# Patient Record
Sex: MALE | Race: WHITE | NOT HISPANIC OR LATINO | URBAN - METROPOLITAN AREA
[De-identification: names, ages, dates, MRNs, and addresses within clinical notes are randomized per-mention and may not be internally consistent; named-entity substitution may affect disease eponyms.]

---

## 2021-06-16 PROBLEM — F51.01 INSOMNIA, IDIOPATHIC: Status: ACTIVE | Noted: 2020-09-08

## 2024-08-19 ENCOUNTER — APPOINTMENT (OUTPATIENT)
Dept: RADIOLOGY | Facility: CLINIC | Age: 34
End: 2024-08-19
Attending: EMERGENCY MEDICINE
Payer: COMMERCIAL

## 2024-08-19 LAB
ANION GAP SERPL CALCULATED.3IONS-SCNC: 13 MMOL/L (ref 7–15)
ATRIAL RATE - MUSE: 98 BPM
BASOPHILS # BLD MANUAL: 0 10E3/UL (ref 0–0.2)
BASOPHILS NFR BLD MANUAL: 0 %
BUN SERPL-MCNC: 22.2 MG/DL (ref 6–20)
CALCIUM SERPL-MCNC: 10.4 MG/DL (ref 8.8–10.4)
CHLORIDE SERPL-SCNC: 101 MMOL/L (ref 98–107)
CREAT SERPL-MCNC: 1.08 MG/DL (ref 0.67–1.17)
DIASTOLIC BLOOD PRESSURE - MUSE: NORMAL MMHG
EGFRCR SERPLBLD CKD-EPI 2021: >90 ML/MIN/1.73M2
EOSINOPHIL # BLD MANUAL: 0 10E3/UL (ref 0–0.7)
EOSINOPHIL NFR BLD MANUAL: 0 %
ERYTHROCYTE [DISTWIDTH] IN BLOOD BY AUTOMATED COUNT: 12.5 % (ref 10–15)
FLUAV RNA SPEC QL NAA+PROBE: NEGATIVE
FLUBV RNA RESP QL NAA+PROBE: NEGATIVE
GLUCOSE SERPL-MCNC: 153 MG/DL (ref 70–99)
HCO3 SERPL-SCNC: 25 MMOL/L (ref 22–29)
HCT VFR BLD AUTO: 46.9 % (ref 40–53)
HGB BLD-MCNC: 16 G/DL (ref 13.3–17.7)
HOLD SPECIMEN: NORMAL
HOLD SPECIMEN: NORMAL
INTERPRETATION ECG - MUSE: NORMAL
LYMPHOCYTES # BLD MANUAL: 2.2 10E3/UL (ref 0.8–5.3)
LYMPHOCYTES NFR BLD MANUAL: 18 %
MCH RBC QN AUTO: 28.7 PG (ref 26.5–33)
MCHC RBC AUTO-ENTMCNC: 34.1 G/DL (ref 31.5–36.5)
MCV RBC AUTO: 84 FL (ref 78–100)
METAMYELOCYTES # BLD MANUAL: 0.1 10E3/UL
METAMYELOCYTES NFR BLD MANUAL: 1 %
MONOCYTES # BLD MANUAL: 0.6 10E3/UL (ref 0–1.3)
MONOCYTES NFR BLD MANUAL: 5 %
NEUTROPHILS # BLD MANUAL: 9.3 10E3/UL (ref 1.6–8.3)
NEUTROPHILS NFR BLD MANUAL: 76 %
NRBC # BLD AUTO: 0 10E3/UL
NRBC BLD AUTO-RTO: 0 /100
P AXIS - MUSE: 37 DEGREES
PLAT MORPH BLD: ABNORMAL
PLATELET # BLD AUTO: 362 10E3/UL (ref 150–450)
POTASSIUM SERPL-SCNC: 4.4 MMOL/L (ref 3.4–5.3)
PR INTERVAL - MUSE: 152 MS
QRS DURATION - MUSE: 80 MS
QT - MUSE: 328 MS
QTC - MUSE: 418 MS
R AXIS - MUSE: 39 DEGREES
RBC # BLD AUTO: 5.58 10E6/UL (ref 4.4–5.9)
RBC MORPH BLD: ABNORMAL
RSV RNA SPEC NAA+PROBE: NEGATIVE
SARS-COV-2 RNA RESP QL NAA+PROBE: NEGATIVE
SODIUM SERPL-SCNC: 139 MMOL/L (ref 135–145)
SYSTOLIC BLOOD PRESSURE - MUSE: NORMAL MMHG
T AXIS - MUSE: -1 DEGREES
TROPONIN T SERPL HS-MCNC: 8 NG/L
VARIANT LYMPHS BLD QL SMEAR: PRESENT
VENTRICULAR RATE- MUSE: 98 BPM
WBC # BLD AUTO: 12.2 10E3/UL (ref 4–11)

## 2024-08-19 PROCEDURE — 999N000157 HC STATISTIC RCP TIME EA 10 MIN

## 2024-08-19 PROCEDURE — 85027 COMPLETE CBC AUTOMATED: CPT | Performed by: EMERGENCY MEDICINE

## 2024-08-19 PROCEDURE — 80048 BASIC METABOLIC PNL TOTAL CA: CPT | Performed by: EMERGENCY MEDICINE

## 2024-08-19 PROCEDURE — 84484 ASSAY OF TROPONIN QUANT: CPT | Performed by: EMERGENCY MEDICINE

## 2024-08-19 PROCEDURE — 85007 BL SMEAR W/DIFF WBC COUNT: CPT | Performed by: EMERGENCY MEDICINE

## 2024-08-19 PROCEDURE — 87637 SARSCOV2&INF A&B&RSV AMP PRB: CPT | Performed by: EMERGENCY MEDICINE

## 2024-08-19 PROCEDURE — 94640 AIRWAY INHALATION TREATMENT: CPT

## 2024-08-19 PROCEDURE — 96365 THER/PROPH/DIAG IV INF INIT: CPT

## 2024-08-19 PROCEDURE — 86308 HETEROPHILE ANTIBODY SCREEN: CPT | Performed by: INTERNAL MEDICINE

## 2024-08-19 PROCEDURE — 99285 EMERGENCY DEPT VISIT HI MDM: CPT | Mod: 25

## 2024-08-19 PROCEDURE — 250N000011 HC RX IP 250 OP 636: Performed by: EMERGENCY MEDICINE

## 2024-08-19 PROCEDURE — 83880 ASSAY OF NATRIURETIC PEPTIDE: CPT | Performed by: INTERNAL MEDICINE

## 2024-08-19 PROCEDURE — 84145 PROCALCITONIN (PCT): CPT | Performed by: INTERNAL MEDICINE

## 2024-08-19 PROCEDURE — 93005 ELECTROCARDIOGRAM TRACING: CPT | Performed by: EMERGENCY MEDICINE

## 2024-08-19 PROCEDURE — 36415 COLL VENOUS BLD VENIPUNCTURE: CPT | Performed by: EMERGENCY MEDICINE

## 2024-08-19 PROCEDURE — 250N000009 HC RX 250: Performed by: EMERGENCY MEDICINE

## 2024-08-19 PROCEDURE — 71046 X-RAY EXAM CHEST 2 VIEWS: CPT

## 2024-08-19 RX ORDER — IPRATROPIUM BROMIDE AND ALBUTEROL SULFATE 2.5; .5 MG/3ML; MG/3ML
3 SOLUTION RESPIRATORY (INHALATION) ONCE
Status: COMPLETED | OUTPATIENT
Start: 2024-08-19 | End: 2024-08-19

## 2024-08-19 RX ORDER — LEVOFLOXACIN 5 MG/ML
500 INJECTION, SOLUTION INTRAVENOUS ONCE
Status: COMPLETED | OUTPATIENT
Start: 2024-08-19 | End: 2024-08-20

## 2024-08-19 RX ADMIN — IPRATROPIUM BROMIDE AND ALBUTEROL SULFATE 3 ML: .5; 3 SOLUTION RESPIRATORY (INHALATION) at 23:21

## 2024-08-19 RX ADMIN — LEVOFLOXACIN 500 MG: 500 INJECTION, SOLUTION INTRAVENOUS at 23:28

## 2024-08-19 ASSESSMENT — ENCOUNTER SYMPTOMS
CHILLS: 1
COUGH: 1
SHORTNESS OF BREATH: 1
FEVER: 1

## 2024-08-19 ASSESSMENT — COLUMBIA-SUICIDE SEVERITY RATING SCALE - C-SSRS
2. HAVE YOU ACTUALLY HAD ANY THOUGHTS OF KILLING YOURSELF IN THE PAST MONTH?: NO
1. IN THE PAST MONTH, HAVE YOU WISHED YOU WERE DEAD OR WISHED YOU COULD GO TO SLEEP AND NOT WAKE UP?: NO
6. HAVE YOU EVER DONE ANYTHING, STARTED TO DO ANYTHING, OR PREPARED TO DO ANYTHING TO END YOUR LIFE?: NO

## 2024-08-20 ENCOUNTER — HOSPITAL ENCOUNTER (INPATIENT)
Facility: CLINIC | Age: 34
LOS: 3 days | Discharge: HOME | End: 2024-08-23
Attending: EMERGENCY MEDICINE | Admitting: INTERNAL MEDICINE
Payer: COMMERCIAL

## 2024-08-20 ENCOUNTER — APPOINTMENT (OUTPATIENT)
Dept: CT IMAGING | Facility: CLINIC | Age: 34
End: 2024-08-20
Attending: EMERGENCY MEDICINE
Payer: COMMERCIAL

## 2024-08-20 DIAGNOSIS — J18.9 PNEUMONIA OF LEFT LOWER LOBE DUE TO INFECTIOUS ORGANISM: ICD-10-CM

## 2024-08-20 DIAGNOSIS — G25.81 RESTLESS LEGS SYNDROME (RLS): Primary | ICD-10-CM

## 2024-08-20 DIAGNOSIS — F51.01 INSOMNIA, IDIOPATHIC: ICD-10-CM

## 2024-08-20 DIAGNOSIS — R06.2 WHEEZING: ICD-10-CM

## 2024-08-20 LAB
ALBUMIN SERPL BCG-MCNC: 4.1 G/DL (ref 3.5–5.2)
ALP SERPL-CCNC: 86 U/L (ref 40–150)
ALT SERPL W P-5'-P-CCNC: 69 U/L (ref 0–70)
ANION GAP SERPL CALCULATED.3IONS-SCNC: 12 MMOL/L (ref 7–15)
AST SERPL W P-5'-P-CCNC: 31 U/L (ref 0–45)
BASE EXCESS BLDV CALC-SCNC: 0.7 MMOL/L (ref -3–3)
BASOPHILS # BLD AUTO: 0 10E3/UL (ref 0–0.2)
BASOPHILS NFR BLD AUTO: 0 %
BILIRUB SERPL-MCNC: 0.3 MG/DL
BUN SERPL-MCNC: 18.9 MG/DL (ref 6–20)
C PNEUM DNA SPEC QL NAA+PROBE: NOT DETECTED
CALCIUM SERPL-MCNC: 9.4 MG/DL (ref 8.8–10.4)
CHLORIDE SERPL-SCNC: 103 MMOL/L (ref 98–107)
CREAT SERPL-MCNC: 0.89 MG/DL (ref 0.67–1.17)
EGFRCR SERPLBLD CKD-EPI 2021: >90 ML/MIN/1.73M2
EOSINOPHIL # BLD AUTO: 0 10E3/UL (ref 0–0.7)
EOSINOPHIL NFR BLD AUTO: 0 %
ERYTHROCYTE [DISTWIDTH] IN BLOOD BY AUTOMATED COUNT: 12.5 % (ref 10–15)
FLUAV H1 2009 PAND RNA SPEC QL NAA+PROBE: NOT DETECTED
FLUAV H1 RNA SPEC QL NAA+PROBE: NOT DETECTED
FLUAV H3 RNA SPEC QL NAA+PROBE: NOT DETECTED
FLUAV RNA SPEC QL NAA+PROBE: NOT DETECTED
FLUBV RNA SPEC QL NAA+PROBE: NOT DETECTED
GLUCOSE BLDC GLUCOMTR-MCNC: 157 MG/DL (ref 70–99)
GLUCOSE SERPL-MCNC: 161 MG/DL (ref 70–99)
HADV DNA SPEC QL NAA+PROBE: NOT DETECTED
HCO3 BLDV-SCNC: 25 MMOL/L (ref 21–28)
HCO3 SERPL-SCNC: 22 MMOL/L (ref 22–29)
HCOV PNL SPEC NAA+PROBE: NOT DETECTED
HCT VFR BLD AUTO: 42.1 % (ref 40–53)
HGB BLD-MCNC: 14.4 G/DL (ref 13.3–17.7)
HMPV RNA SPEC QL NAA+PROBE: NOT DETECTED
HPIV1 RNA SPEC QL NAA+PROBE: NOT DETECTED
HPIV2 RNA SPEC QL NAA+PROBE: NOT DETECTED
HPIV3 RNA SPEC QL NAA+PROBE: NOT DETECTED
HPIV4 RNA SPEC QL NAA+PROBE: NOT DETECTED
IMM GRANULOCYTES # BLD: 0.2 10E3/UL
IMM GRANULOCYTES NFR BLD: 2 %
L PNEUMO1 AG UR QL IA: NEGATIVE
LYMPHOCYTES # BLD AUTO: 1.2 10E3/UL (ref 0.8–5.3)
LYMPHOCYTES NFR BLD AUTO: 11 %
M PNEUMO DNA SPEC QL NAA+PROBE: DETECTED
MCH RBC QN AUTO: 28.6 PG (ref 26.5–33)
MCHC RBC AUTO-ENTMCNC: 34.2 G/DL (ref 31.5–36.5)
MCV RBC AUTO: 84 FL (ref 78–100)
MONOCYTES # BLD AUTO: 0.3 10E3/UL (ref 0–1.3)
MONOCYTES NFR BLD AUTO: 3 %
MONOCYTES NFR BLD AUTO: NEGATIVE %
MRSA DNA SPEC QL NAA+PROBE: NEGATIVE
NEUTROPHILS # BLD AUTO: 9.1 10E3/UL (ref 1.6–8.3)
NEUTROPHILS NFR BLD AUTO: 84 %
NRBC # BLD AUTO: 0 10E3/UL
NRBC BLD AUTO-RTO: 0 /100
NT-PROBNP SERPL-MCNC: 47 PG/ML (ref 0–450)
O2/TOTAL GAS SETTING VFR VENT: 21 %
OXYHGB MFR BLDV: 71 % (ref 70–75)
PCO2 BLDV: 36 MM HG (ref 40–50)
PH BLDV: 7.45 [PH] (ref 7.32–7.43)
PLATELET # BLD AUTO: 301 10E3/UL (ref 150–450)
PO2 BLDV: 37 MM HG (ref 25–47)
POTASSIUM SERPL-SCNC: 4.7 MMOL/L (ref 3.4–5.3)
PROCALCITONIN SERPL IA-MCNC: 0.13 NG/ML
PROT SERPL-MCNC: 6.9 G/DL (ref 6.4–8.3)
RBC # BLD AUTO: 5.03 10E6/UL (ref 4.4–5.9)
RSV RNA SPEC QL NAA+PROBE: NOT DETECTED
RSV RNA SPEC QL NAA+PROBE: NOT DETECTED
RV+EV RNA SPEC QL NAA+PROBE: NOT DETECTED
S PNEUM AG SPEC QL: NEGATIVE
SA TARGET DNA: NEGATIVE
SAO2 % BLDV: 71.9 % (ref 70–75)
SODIUM SERPL-SCNC: 137 MMOL/L (ref 135–145)
WBC # BLD AUTO: 10.8 10E3/UL (ref 4–11)

## 2024-08-20 PROCEDURE — 87070 CULTURE OTHR SPECIMN AEROBIC: CPT | Performed by: INTERNAL MEDICINE

## 2024-08-20 PROCEDURE — 250N000012 HC RX MED GY IP 250 OP 636 PS 637: Performed by: INTERNAL MEDICINE

## 2024-08-20 PROCEDURE — 87040 BLOOD CULTURE FOR BACTERIA: CPT | Performed by: EMERGENCY MEDICINE

## 2024-08-20 PROCEDURE — 99418 PROLNG IP/OBS E/M EA 15 MIN: CPT | Performed by: INTERNAL MEDICINE

## 2024-08-20 PROCEDURE — 87581 M.PNEUMON DNA AMP PROBE: CPT | Performed by: INTERNAL MEDICINE

## 2024-08-20 PROCEDURE — 85025 COMPLETE CBC W/AUTO DIFF WBC: CPT | Performed by: INTERNAL MEDICINE

## 2024-08-20 PROCEDURE — 250N000013 HC RX MED GY IP 250 OP 250 PS 637: Performed by: INTERNAL MEDICINE

## 2024-08-20 PROCEDURE — 82805 BLOOD GASES W/O2 SATURATION: CPT | Performed by: INTERNAL MEDICINE

## 2024-08-20 PROCEDURE — 96375 TX/PRO/DX INJ NEW DRUG ADDON: CPT

## 2024-08-20 PROCEDURE — G0378 HOSPITAL OBSERVATION PER HR: HCPCS

## 2024-08-20 PROCEDURE — 99207 PR APP CREDIT; MD BILLING SHARED VISIT: CPT | Performed by: INTERNAL MEDICINE

## 2024-08-20 PROCEDURE — 71275 CT ANGIOGRAPHY CHEST: CPT

## 2024-08-20 PROCEDURE — 96366 THER/PROPH/DIAG IV INF ADDON: CPT

## 2024-08-20 PROCEDURE — 87641 MR-STAPH DNA AMP PROBE: CPT | Performed by: INTERNAL MEDICINE

## 2024-08-20 PROCEDURE — 36415 COLL VENOUS BLD VENIPUNCTURE: CPT | Performed by: EMERGENCY MEDICINE

## 2024-08-20 PROCEDURE — 120N000001 HC R&B MED SURG/OB

## 2024-08-20 PROCEDURE — 250N000011 HC RX IP 250 OP 636: Performed by: EMERGENCY MEDICINE

## 2024-08-20 PROCEDURE — 258N000003 HC RX IP 258 OP 636: Performed by: INTERNAL MEDICINE

## 2024-08-20 PROCEDURE — 87640 STAPH A DNA AMP PROBE: CPT | Performed by: INTERNAL MEDICINE

## 2024-08-20 PROCEDURE — 250N000011 HC RX IP 250 OP 636: Performed by: INTERNAL MEDICINE

## 2024-08-20 PROCEDURE — 80053 COMPREHEN METABOLIC PANEL: CPT | Performed by: INTERNAL MEDICINE

## 2024-08-20 PROCEDURE — 99223 1ST HOSP IP/OBS HIGH 75: CPT | Performed by: INTERNAL MEDICINE

## 2024-08-20 PROCEDURE — 36415 COLL VENOUS BLD VENIPUNCTURE: CPT | Performed by: INTERNAL MEDICINE

## 2024-08-20 PROCEDURE — 82962 GLUCOSE BLOOD TEST: CPT

## 2024-08-20 PROCEDURE — 87899 AGENT NOS ASSAY W/OPTIC: CPT | Performed by: INTERNAL MEDICINE

## 2024-08-20 RX ORDER — NALOXONE HYDROCHLORIDE 0.4 MG/ML
0.4 INJECTION, SOLUTION INTRAMUSCULAR; INTRAVENOUS; SUBCUTANEOUS
Status: DISCONTINUED | OUTPATIENT
Start: 2024-08-20 | End: 2024-08-23 | Stop reason: HOSPADM

## 2024-08-20 RX ORDER — PREGABALIN 75 MG/1
150 CAPSULE ORAL DAILY
Status: DISCONTINUED | OUTPATIENT
Start: 2024-08-20 | End: 2024-08-23 | Stop reason: HOSPADM

## 2024-08-20 RX ORDER — GUAIFENESIN 600 MG/1
600 TABLET, EXTENDED RELEASE ORAL 2 TIMES DAILY
Status: DISCONTINUED | OUTPATIENT
Start: 2024-08-20 | End: 2024-08-23 | Stop reason: HOSPADM

## 2024-08-20 RX ORDER — FLUTICASONE FUROATE AND VILANTEROL 100; 25 UG/1; UG/1
1 POWDER RESPIRATORY (INHALATION) DAILY
Status: DISCONTINUED | OUTPATIENT
Start: 2024-08-20 | End: 2024-08-23 | Stop reason: HOSPADM

## 2024-08-20 RX ORDER — ACETAMINOPHEN 650 MG/1
650 SUPPOSITORY RECTAL EVERY 4 HOURS PRN
Status: DISCONTINUED | OUTPATIENT
Start: 2024-08-20 | End: 2024-08-23 | Stop reason: HOSPADM

## 2024-08-20 RX ORDER — IOPAMIDOL 755 MG/ML
75 INJECTION, SOLUTION INTRAVASCULAR ONCE
Status: COMPLETED | OUTPATIENT
Start: 2024-08-20 | End: 2024-08-20

## 2024-08-20 RX ORDER — CEFPROZIL 500 MG/1
500 TABLET, FILM COATED ORAL 2 TIMES DAILY
Status: ON HOLD | COMMUNITY
Start: 2024-08-16 | End: 2024-08-23

## 2024-08-20 RX ORDER — BENZONATATE 100 MG/1
100 CAPSULE ORAL 3 TIMES DAILY PRN
Status: DISCONTINUED | OUTPATIENT
Start: 2024-08-20 | End: 2024-08-23 | Stop reason: HOSPADM

## 2024-08-20 RX ORDER — PREGABALIN 150 MG/1
150 CAPSULE ORAL DAILY
COMMUNITY
Start: 2024-04-18

## 2024-08-20 RX ORDER — BENZONATATE 100 MG/1
200 CAPSULE ORAL 3 TIMES DAILY PRN
Status: DISCONTINUED | OUTPATIENT
Start: 2024-08-20 | End: 2024-08-20

## 2024-08-20 RX ORDER — ONDANSETRON 4 MG/1
4 TABLET, ORALLY DISINTEGRATING ORAL EVERY 6 HOURS PRN
Status: DISCONTINUED | OUTPATIENT
Start: 2024-08-20 | End: 2024-08-23 | Stop reason: HOSPADM

## 2024-08-20 RX ORDER — SODIUM CHLORIDE 9 MG/ML
INJECTION, SOLUTION INTRAVENOUS CONTINUOUS
Status: DISCONTINUED | OUTPATIENT
Start: 2024-08-20 | End: 2024-08-22

## 2024-08-20 RX ORDER — ALBUTEROL SULFATE 90 UG/1
1-2 AEROSOL, METERED RESPIRATORY (INHALATION) EVERY 6 HOURS PRN
COMMUNITY
Start: 2024-08-16

## 2024-08-20 RX ORDER — GUAIFENESIN/DEXTROMETHORPHAN 100-10MG/5
10 SYRUP ORAL EVERY 4 HOURS PRN
Status: DISCONTINUED | OUTPATIENT
Start: 2024-08-20 | End: 2024-08-23 | Stop reason: HOSPADM

## 2024-08-20 RX ORDER — PREDNISONE 20 MG/1
3 TABLET ORAL DAILY
Status: ON HOLD | COMMUNITY
Start: 2024-08-16 | End: 2024-08-23

## 2024-08-20 RX ORDER — ALBUTEROL SULFATE 5 MG/ML
2.5 SOLUTION RESPIRATORY (INHALATION) EVERY 4 HOURS PRN
Status: DISCONTINUED | OUTPATIENT
Start: 2024-08-20 | End: 2024-08-22

## 2024-08-20 RX ORDER — HYDROCODONE BITARTRATE AND ACETAMINOPHEN 5; 325 MG/1; MG/1
1 TABLET ORAL EVERY 4 HOURS PRN
Status: DISCONTINUED | OUTPATIENT
Start: 2024-08-20 | End: 2024-08-23 | Stop reason: HOSPADM

## 2024-08-20 RX ORDER — CLONAZEPAM 2 MG/1
2 TABLET ORAL 3 TIMES DAILY
COMMUNITY
Start: 2024-06-05

## 2024-08-20 RX ORDER — NALOXONE HYDROCHLORIDE 0.4 MG/ML
0.2 INJECTION, SOLUTION INTRAMUSCULAR; INTRAVENOUS; SUBCUTANEOUS
Status: DISCONTINUED | OUTPATIENT
Start: 2024-08-20 | End: 2024-08-23 | Stop reason: HOSPADM

## 2024-08-20 RX ORDER — AMOXICILLIN 250 MG
1 CAPSULE ORAL 2 TIMES DAILY PRN
Status: DISCONTINUED | OUTPATIENT
Start: 2024-08-20 | End: 2024-08-23 | Stop reason: HOSPADM

## 2024-08-20 RX ORDER — METHYLPREDNISOLONE SODIUM SUCCINATE 125 MG/2ML
125 INJECTION, POWDER, LYOPHILIZED, FOR SOLUTION INTRAMUSCULAR; INTRAVENOUS ONCE
Status: COMPLETED | OUTPATIENT
Start: 2024-08-20 | End: 2024-08-20

## 2024-08-20 RX ORDER — LEVOFLOXACIN 5 MG/ML
750 INJECTION, SOLUTION INTRAVENOUS EVERY 24 HOURS
Status: DISCONTINUED | OUTPATIENT
Start: 2024-08-20 | End: 2024-08-23 | Stop reason: HOSPADM

## 2024-08-20 RX ORDER — AMOXICILLIN 250 MG
2 CAPSULE ORAL 2 TIMES DAILY PRN
Status: DISCONTINUED | OUTPATIENT
Start: 2024-08-20 | End: 2024-08-23 | Stop reason: HOSPADM

## 2024-08-20 RX ORDER — ACETAMINOPHEN 325 MG/1
650 TABLET ORAL EVERY 4 HOURS PRN
Status: DISCONTINUED | OUTPATIENT
Start: 2024-08-20 | End: 2024-08-23 | Stop reason: HOSPADM

## 2024-08-20 RX ORDER — PREDNISONE 20 MG/1
40 TABLET ORAL DAILY
Status: DISCONTINUED | OUTPATIENT
Start: 2024-08-20 | End: 2024-08-23 | Stop reason: HOSPADM

## 2024-08-20 RX ORDER — CLONAZEPAM 1 MG/1
2 TABLET ORAL 3 TIMES DAILY
Status: DISCONTINUED | OUTPATIENT
Start: 2024-08-20 | End: 2024-08-23 | Stop reason: HOSPADM

## 2024-08-20 RX ORDER — HYDROMORPHONE HYDROCHLORIDE 1 MG/ML
0.2 INJECTION, SOLUTION INTRAMUSCULAR; INTRAVENOUS; SUBCUTANEOUS EVERY 4 HOURS PRN
Status: DISCONTINUED | OUTPATIENT
Start: 2024-08-20 | End: 2024-08-23 | Stop reason: HOSPADM

## 2024-08-20 RX ORDER — BENZONATATE 200 MG/1
200 CAPSULE ORAL 3 TIMES DAILY PRN
COMMUNITY
Start: 2024-08-16

## 2024-08-20 RX ORDER — ONDANSETRON 2 MG/ML
4 INJECTION INTRAMUSCULAR; INTRAVENOUS EVERY 6 HOURS PRN
Status: DISCONTINUED | OUTPATIENT
Start: 2024-08-20 | End: 2024-08-23 | Stop reason: HOSPADM

## 2024-08-20 RX ORDER — LEVOFLOXACIN 5 MG/ML
250 INJECTION, SOLUTION INTRAVENOUS ONCE
Status: COMPLETED | OUTPATIENT
Start: 2024-08-20 | End: 2024-08-20

## 2024-08-20 RX ADMIN — GUAIFENESIN 600 MG: 600 TABLET ORAL at 20:55

## 2024-08-20 RX ADMIN — BENZONATATE 100 MG: 100 CAPSULE ORAL at 17:20

## 2024-08-20 RX ADMIN — LEVOFLOXACIN 750 MG: 750 INJECTION, SOLUTION INTRAVENOUS at 23:35

## 2024-08-20 RX ADMIN — SODIUM CHLORIDE: 9 INJECTION, SOLUTION INTRAVENOUS at 02:54

## 2024-08-20 RX ADMIN — CLONAZEPAM 2 MG: 0.5 TABLET ORAL at 09:09

## 2024-08-20 RX ADMIN — GUAIFENESIN AND DEXTROMETHORPHAN 10 ML: 100; 10 SYRUP ORAL at 17:20

## 2024-08-20 RX ADMIN — PREGABALIN 150 MG: 75 CAPSULE ORAL at 09:09

## 2024-08-20 RX ADMIN — BENZONATATE 100 MG: 100 CAPSULE ORAL at 01:58

## 2024-08-20 RX ADMIN — SODIUM CHLORIDE: 9 INJECTION, SOLUTION INTRAVENOUS at 11:20

## 2024-08-20 RX ADMIN — FLUTICASONE FUROATE AND VILANTEROL TRIFENATATE 1 PUFF: 100; 25 POWDER RESPIRATORY (INHALATION) at 10:13

## 2024-08-20 RX ADMIN — SODIUM CHLORIDE: 9 INJECTION, SOLUTION INTRAVENOUS at 20:04

## 2024-08-20 RX ADMIN — LEVOFLOXACIN 250 MG: 250 INJECTION, SOLUTION INTRAVENOUS at 02:02

## 2024-08-20 RX ADMIN — PREDNISONE 40 MG: 20 TABLET ORAL at 10:12

## 2024-08-20 RX ADMIN — IOPAMIDOL 75 ML: 755 INJECTION, SOLUTION INTRAVENOUS at 01:22

## 2024-08-20 RX ADMIN — METHYLPREDNISOLONE SODIUM SUCCINATE 125 MG: 125 INJECTION, POWDER, FOR SOLUTION INTRAMUSCULAR; INTRAVENOUS at 00:34

## 2024-08-20 RX ADMIN — CLONAZEPAM 2 MG: 0.5 TABLET ORAL at 20:55

## 2024-08-20 RX ADMIN — CLONAZEPAM 2 MG: 0.5 TABLET ORAL at 14:10

## 2024-08-20 RX ADMIN — GUAIFENESIN AND DEXTROMETHORPHAN 10 ML: 100; 10 SYRUP ORAL at 03:22

## 2024-08-20 RX ADMIN — GUAIFENESIN 600 MG: 600 TABLET ORAL at 10:12

## 2024-08-20 ASSESSMENT — ACTIVITIES OF DAILY LIVING (ADL)
ADLS_ACUITY_SCORE: 18
ADLS_ACUITY_SCORE: 35
ADLS_ACUITY_SCORE: 18
ADLS_ACUITY_SCORE: 35

## 2024-08-20 NOTE — H&P
[facility] MEDICINE ADMISSION HISTORY AND PHYSICAL       Assessment & Plan      This is a 33 year old white landon with left sided PNA       Present on Admission (POA)    1. Possible PNA, L - one week of cough, SOB and fever, with hemoptysis and chest tightness - Travelled to [place] and [place] 2 months ago.     - S/P at least 3 days of Cefzil - no improvement, COVID negative  - O2 sats fluctuates 92-94% on RA  - No evidence of pericarditis on EKG   - Continue levaquin  - Continue cough meds/albuterol nebs PRN  - Cultures, antigens and resp PCR  - pulse ox   - CBC/procal/VBG  - Was given 125 mgs solumedrol in ED ---- will hold further dose - Denies asthma or COPD diagnosis        Chronic Medical Conditions    1. RLS   2. Chronic LBP  3. Insomnia    155PM - No PE but has Bronchial wall thickening and endobronchial mucous plugs present especially within left lower lobe and lingula. There are bands of parenchymal opacity in the same distribution likely atelectasis though developing pneumonia also   possible.          VTE prophylaxis --  SCDs, if appropriate, add SQH  Diet --  regular   Code Status -- Full  Barriers to discharge -- Admitting/Acute medical condition/s  Discharge Disposition and goals --  Unable to determine at this point, pending progress/treatment response.     PPE - I was wearing PPE including but not limited to - N95 mask, Gloves, and/or Safety glasses.  Admission Status -- Observation    Care plan is based on available information and patient's condition at encounter. Care plan was discussed and agreed to proceed by the patient/family member. Made aware that care plan may change.     I recommend to review/revise history/care plan for information/results not available during my encounter. All or some home medication/s were not resumed or was modified on admission due to safety concerns. Will have rounding AM doc  review safety to resume held/modified home medications.      Availability -- If need to coordinate care after night shift  -- Contact assigned AM rounding Hospitalist.     75 minutes spent by me on the date of service doing chart review, history, exam, diagnostic test results interpretation, care coordination with RN, RT and/or Pharm, & further activities per the note.      Sabino Weston MD, MPH, FACP, [facility]  Internal Medicine - Hospitalist        Chief Complaint Cough      HISTORY     - Patient was seen in ED - 19  - Here for cough, SOB and fever - for a week now. He also have chest tightness. He also coughed out blood x2  - Was seen at Urgent care 8/16 - and was given antibiotic, prednisone and albuterol inhaler  - RN call line today - as he would run out of air after a few words spoken, chest tightness/compression. Feels not better  - Recent travel to [place] and [place] - 2 months ago   - No one at home  with respiratory symptoms.  - No belly pain, recent loose stools - no recurrence.     - ED course/treatments/referral - Was given Levaquin. Trop negative. PE study ordered - given chest tightness/hemoptysis. Chest XR - An ill-defined hazy opacity seen in the left lateral lung base suspicious for pneumonia.      - ROS --- No headache. No dizziness. No weakness. No palpitations. No abdominal pain. No nausea or vomiting. No urinary symptoms. No bleeding symptoms. No weight loss. Rest of 12 point ROS was reviewed and negative.       Past Medical History     History reviewed. No pertinent past medical history.      Surgical History     History reviewed. No pertinent surgical history.     Family History      History reviewed. No pertinent family history.      Social History      .  Social History     Socioeconomic History    Marital status:      Spouse name: Not on file    Number of children: Not on file    Years of education: Not on file    Highest education level: Not on file   Occupational History    Not on file   Tobacco Use    Smoking status: Not on file     Smokeless tobacco: Not on file   Substance and Sexual Activity    Alcohol use: Not on file    Drug use: Not on file    Sexual activity: Not on file   Other Topics Concern    Not on file   Social History Narrative    Not on file     Social Determinants of Health     Financial Resource Strain: Low Risk  (6/8/2024)    Received from [facility]    Financial Resource Strain     Difficulty of Paying Living Expenses: 3     Difficulty of Paying Living Expenses: Not on file   Food Insecurity: No Food Insecurity (6/8/2024)    Received from [facility]    Food Insecurity     Worried About Running Out of Food in the Last Year: 1   Transportation Needs: No Transportation Needs (6/8/2024)    Received from [facility]    Transportation Needs     Lack of Transportation (Medical): 1   Physical Activity: Not on file   Stress: Not on file   Social Connections: Socially Integrated (6/8/2024)    [facility]    Social Connections     Frequency of Communication with Friends and Family: 0   Interpersonal Safety: Not on file   Housing Stability: Low Risk  (6/8/2024)    Received from [facility]    Housing Stability     Unable to Pay for Housing in the Last Year: 1          Allergies        Allergies   Allergen Reactions    Penicillins Hives and Rash         Prior to Admission Medications      Current Facility-Administered Medications   Medication Dose Route Frequency Provider Last Rate Last Admin    methylPREDNISolone Na Suc (solu-MEDROL) injection 125 mg  125 mg Intravenous Once Angel Mejia, DO         Current Outpatient Medications   Medication Sig Dispense Refill    HYDROcodone-acetaminophen 5-325 mg per tablet [HYDROCODONE-ACETAMINOPHEN 5-325 MG PER TABLET] Take 1 tablet by mouth every 4 (four) hours as needed for pain. 8 tablet 0           Review of  Systems     A 12 point comprehensive review of systems was negative except as noted above in HPI.    PHYSICAL EXAMINATION       Vitals      Vitals: /60   Pulse 100   Temp 98.2  F (36.8  C) (Temporal)   Resp 20   Ht 1.829 m (6')   Wt 98.2 kg (216 lb 6.4 oz)   SpO2 93%   BMI 29.35 kg/m    BMI= Body mass index is 29.35 kg/m .      Examination     General Appearance:  Alert, cooperative, no distress  Head:    Normocephalic, without obvious abnormality, atraumatic  EENT:  PERRL, conjunctiva/corneas clear, EOM's intact.   Neck:   Supple, symmetrical, trachea midline, no adenopathy; no NVE  Back:  Symmetric, no curvature, no CVA tenderness  Chest/Lungs: decreased air entry, (+)  rhonchi, no wheezes, respirations unlabored, No tenderness or deformity. No abdominal breathing or use of accessory muscles.   Heart:    Regular rate and rhythm, S1 and S2 normal, no murmur, rub   or gallop  Abdomen: Soft, non-tender, bowel sounds active all four quadrants, not peritoneal on palpation. Not distended  Extremities:  Extremities normal, atraumatic, no swelling   Skin:  Skin color, texture, turgor normal, no rashes or lesion  Neurologic:  Awake and alert, No lateralizing or localizing signs             Pertinent Lab     Results for orders placed or performed during the hospital encounter of 08/20/24   XR Chest 2 Views    Impression    IMPRESSION: An ill-defined hazy opacity seen in the left lateral lung base suspicious for pneumonia. Mild atelectasis in the right lung base. No pleural effusion or pneumothorax. Heart size and pulmonary vascularity are within normal limits.   Basic metabolic panel   Result Value Ref Range    Sodium 139 135 - 145 mmol/L    Potassium 4.4 3.4 - 5.3 mmol/L    Chloride 101 98 - 107 mmol/L    Carbon Dioxide (CO2) 25 22 - 29 mmol/L    Anion Gap 13 7 - 15 mmol/L    Urea Nitrogen 22.2 (H) 6.0 - 20.0 mg/dL    Creatinine 1.08 0.67 - 1.17 mg/dL    GFR Estimate >90 >60 mL/min/1.73m2    Calcium 10.4 8.8 -  10.4 mg/dL    Glucose 153 (H) 70 - 99 mg/dL   Result Value Ref Range    Troponin T, High Sensitivity 8 <=22 ng/L   Symptomatic Influenza A/B, RSV, & SARS-CoV2 PCR (COVID-19) Nasopharyngeal    Specimen: Nasopharyngeal; Swab   Result Value Ref Range    Influenza A PCR Negative Negative    Influenza B PCR Negative Negative    RSV PCR Negative Negative    SARS CoV2 PCR Negative Negative   Extra Blue Top Tube   Result Value Ref Range    Hold Specimen JIC    Extra Red Top Tube   Result Value Ref Range    Hold Specimen JIC    CBC with platelets and differential   Result Value Ref Range    WBC Count 12.2 (H) 4.0 - 11.0 10e3/uL    RBC Count 5.58 4.40 - 5.90 10e6/uL    Hemoglobin 16.0 13.3 - 17.7 g/dL    Hematocrit 46.9 40.0 - 53.0 %    MCV 84 78 - 100 fL    MCH 28.7 26.5 - 33.0 pg    MCHC 34.1 31.5 - 36.5 g/dL    RDW 12.5 10.0 - 15.0 %    Platelet Count 362 150 - 450 10e3/uL    NRBCs per 100 WBC 0 <1 /100    Absolute NRBCs 0.0 10e3/uL   Manual Differential   Result Value Ref Range    % Neutrophils 76 %    % Lymphocytes 18 %    % Monocytes 5 %    % Eosinophils 0 %    % Basophils 0 %    % Metamyelocytes 1 %    Absolute Neutrophils 9.3 (H) 1.6 - 8.3 10e3/uL    Absolute Lymphocytes 2.2 0.8 - 5.3 10e3/uL    Absolute Monocytes 0.6 0.0 - 1.3 10e3/uL    Absolute Eosinophils 0.0 0.0 - 0.7 10e3/uL    Absolute Basophils 0.0 0.0 - 0.2 10e3/uL    Absolute Metamyelocytes 0.1 (H) <=0.0 10e3/uL    RBC Morphology Confirmed RBC Indices     Platelet Assessment  Automated Count Confirmed. Platelet morphology is normal.     Automated Count Confirmed. Platelet morphology is normal.    Reactive Lymphocytes Present (A) None Seen   ECG 12-LEAD WITH MUSE (LHE)   Result Value Ref Range    Systolic Blood Pressure  mmHg    Diastolic Blood Pressure  mmHg    Ventricular Rate 98 BPM    Atrial Rate 98 BPM    DC Interval 152 ms    QRS Duration 80 ms     ms    QTc 418 ms    P Axis 37 degrees    R AXIS 39 degrees    T Axis -1 degrees    Interpretation  ECG       Sinus rhythm  Nonspecific T wave abnormality  Abnormal ECG  No previous ECGs available  Confirmed by SEE ED PROVIDER NOTE FOR, ECG INTERPRETATION ([id number]),  MEREDITH BAINS ([id number]) on 8/19/2024 8:53:21 PM             Pertinent Radiology

## 2024-08-20 NOTE — ED PROVIDER NOTES
EMERGENCY DEPARTMENT ENCOUNTER      NAME: Spencer A Von Behren  AGE: 33 year old male  YOB: 1990  MRN: [id number]  EVALUATION DATE & TIME: No admission date for patient encounter.    PCP: Isaac Junior    ED PROVIDER: Angel Mejia DO      Chief Complaint   Patient presents with    Shortness of Breath    Cough    Chest Pain         FINAL IMPRESSION:  1. Pneumonia of left lower lobe due to infectious organism    2. Wheezing          ED COURSE & MEDICAL DECISION MAKIN-year-old male presented to the ED for evaluation of cough, shortness of breath, and chest discomfort that has been ongoing for the last week.  The patient was prescribed Cefzil to treat the possibility of pneumonia 3 days ago.  The patient has been taking the antibiotic in addition to Tessalon, prednisone, and using an albuterol inhaler without any improvement of her symptoms over the last 3 days.  Upon arrival to the ED the patient was tachycardic and tachypneic.  His O2 sats were in the low 90s on room air.  On exam the patient was fatigued and mildly ill-appearing.  He was noted to have increased work of breathing, tachypnea, as well as diffuse crackles, left greater than right, and bilateral expiratory wheezing.      Morbid cell count was elevated 12.2.  BMP and troponin were both reassuring.  Testing for COVID and influenza are both negative.      Chest x-ray shows an opacity in the left lower lobe.    The patient was given a DuoNeb breathing treatment and started on IV Levaquin.  The patient reported no change in his shortness of breath with the DuoNeb breathing treatment.     Because the patient failed outpatient biotics and was still experiencing shortness of breath despite receiving breathing treatment here in the ED he was admitted for further evaluation after discussed the case with the hospitalist.     Pertinent Labs & Imaging studies reviewed. (See chart for details)  10:54 PM I met with the patient to gather  history and to perform my initial exam. We discussed plans for the ED course, including diagnostic testing and treatment.       At the conclusion of the encounter I discussed the results of all of the tests and the disposition. The questions were answered. The patient or family acknowledged understanding and was agreeable with the care plan.     Medical Decision Making    History:  Supplemental history from: N/A  External Record(s) reviewed: Outpatient Record: 8/19/2024 [facility] Nurse Triage Line and 8/16/2024 [facility] Ur[facility]    Work Up:  Chart documentation includes differential considered and any EKGs or imaging independently interpreted by provider, where specified.  In additional to work up documented, I considered the following work up: Documented in chart, if applicable.    External consultation:  Discussion of management with another provider: Documented in chart, if applicable    Complicating factors:  Care impacted by chronic illness: N/A  Care affected by social determinants of health: N/A    Disposition considerations: Admit.      MEDICATIONS GIVEN IN THE EMERGENCY:  Medications   ipratropium - albuterol 0.5 mg/2.5 mg/3 mL (DUONEB) neb solution 3 mL (3 mLs Nebulization $Given 8/19/24 2321)   levofloxacin (LEVAQUIN) infusion 500 mg (0 mg Intravenous Stopped 8/20/24 0031)   methylPREDNISolone Na Suc (solu-MEDROL) injection 125 mg (125 mg Intravenous $Given 8/20/24 0034)       NEW PRESCRIPTIONS STARTED AT TODAY'S ER VISIT  New Prescriptions    No medications on file          =================================================================    HPI    Patient information was obtained from: the patient     Use of : N/A         Waqar A Von Behren is a 33 year old male with a pertinent history of insomnia and restless legs syndrome who presents to this ED by private car for evaluation of shortness of breath     Patient called [facility] Nurse Triage Line prior  "to arrival. Writer noted while talking with patient that he would run out of air after a few words spoken. Patient advised to present to the ED for chest tightness, lasting 5 minutes and described as crushing, pressure-like, or heavy.     The patient reports cough, chest pain, fever, chills, and shortness of breath going on for a week. He went to the Urgent Care on  8/16 (3 days ago) and started on antibiotics. States the antibiotics has been improving the fever and chills but his shortness of breath is the same. It is painful to breath after he coughs. Describes the chest pain like \"compression.\"     Denies history of asthma but states he had a really bad cough with similar symptoms, except for shortness of breath, when he was an adolescent.     The patient denies any other complaints at this time.     Per chart review, patient presented to [facility] on 8/16/2024 (3 days ago) for cough and left ear pain. He endorsed cough, chest tightness, fevers for past 2-3 days. Discharged with Cefzil 500 mg tablets: take one tab twice daily for 10 days, Prednisone 20 mg: take 3 tabs (60mg) once daily for 5 days, Albuterol inhaler: Take 1-2 puffs every 4 hours as needed, and Tessalon 200 mg: take one every 8 hours as needed.       REVIEW OF SYSTEMS   Review of Systems   Constitutional:  Positive for chills (improving) and fever (improving).   Respiratory:  Positive for cough and shortness of breath.    Cardiovascular:  Positive for chest pain (\"compression\").   All other systems reviewed and are negative.       PAST MEDICAL HISTORY:  History reviewed. No pertinent past medical history.    PAST SURGICAL HISTORY:  History reviewed. No pertinent surgical history.        CURRENT MEDICATIONS:    HYDROcodone-acetaminophen 5-325 mg per tablet        ALLERGIES:  Allergies   Allergen Reactions    Penicillins Hives and Rash       FAMILY HISTORY:  History reviewed. No pertinent family history.    SOCIAL HISTORY:   Social " History     Socioeconomic History    Marital status:      Spouse name: None    Number of children: None    Years of education: None    Highest education level: None     Social Determinants of Health     Financial Resource Strain: Low Risk  (6/8/2024)    Received from [facility]    Financial Resource Strain     Difficulty of Paying Living Expenses: 3   Food Insecurity: No Food Insecurity (6/8/2024)    Received from [facility]    Food Insecurity     Worried About Running Out of Food in the Last Year: 1   Transportation Needs: No Transportation Needs (6/8/2024)    Received from [facility]    Transportation Needs     Lack of Transportation (Medical): 1   Social Connections: Socially Integrated (6/8/2024)    Received from [facility]    Social Connections     Frequency of Communication with Friends and Family: 0   Housing Stability: Low Risk  (6/8/2024)    Received from [facility]    Housing Stability     Unable to Pay for Housing in the Last Year: 1       VITALS:  /60   Pulse 100   Temp 98.2  F (36.8  C) (Temporal)   Resp 20   Ht 1.829 m (6')   Wt 98.2 kg (216 lb 6.4 oz)   SpO2 93%   BMI 29.35 kg/m      PHYSICAL EXAM    General presentation: Alert, Vital signs reviewed. NAD. Fatigue appearing.  HENT: ENT inspection is normal. Oropharynx is moist and clear.   Eye: Pupils are equal and reactive to light. EOMI  Neck: The neck is supple, with full ROM, with no evidence of meningismus.  Pulmonary: Tachypneic with increased work of breathing. Diffused crackles noted bilaterally, left greater than right, with expiratory wheezing noted.  Circulatory: Regular rate and rhythm. Peripheral pulses are strong and equal. No murmurs, rubs, or gallops.   Abdominal: The abdomen is soft. Nontender. No rigidity, guarding, or rebound. Bowel sounds normal.    Neurologic: Alert, oriented to person, place, and time. No motor deficit. No sensory deficit. Cranial nerves II through XII are intact.  Musculoskeletal: No extremity tenderness. Full range of motion in all extremities. No extremity edema.   Skin: Skin color is normal. No rash. Warm. Dry to touch.      LAB:  All pertinent labs reviewed and interpreted.  Results for orders placed or performed during the hospital encounter of 08/20/24   XR Chest 2 Views    Impression    IMPRESSION: An ill-defined hazy opacity seen in the left lateral lung base suspicious for pneumonia. Mild atelectasis in the right lung base. No pleural effusion or pneumothorax. Heart size and pulmonary vascularity are within normal limits.   Basic metabolic panel   Result Value Ref Range    Sodium 139 135 - 145 mmol/L    Potassium 4.4 3.4 - 5.3 mmol/L    Chloride 101 98 - 107 mmol/L    Carbon Dioxide (CO2) 25 22 - 29 mmol/L    Anion Gap 13 7 - 15 mmol/L    Urea Nitrogen 22.2 (H) 6.0 - 20.0 mg/dL    Creatinine 1.08 0.67 - 1.17 mg/dL    GFR Estimate >90 >60 mL/min/1.73m2    Calcium 10.4 8.8 - 10.4 mg/dL    Glucose 153 (H) 70 - 99 mg/dL   Result Value Ref Range    Troponin T, High Sensitivity 8 <=22 ng/L   Symptomatic Influenza A/B, RSV, & SARS-CoV2 PCR (COVID-19) Nasopharyngeal    Specimen: Nasopharyngeal; Swab   Result Value Ref Range    Influenza A PCR Negative Negative    Influenza B PCR Negative Negative    RSV PCR Negative Negative    SARS CoV2 PCR Negative Negative   Extra Blue Top Tube   Result Value Ref Range    Hold Specimen JIC    Extra Red Top Tube   Result Value Ref Range    Hold Specimen JIC    CBC with platelets and differential   Result Value Ref Range    WBC Count 12.2 (H) 4.0 - 11.0 10e3/uL    RBC Count 5.58 4.40 - 5.90 10e6/uL    Hemoglobin 16.0 13.3 - 17.7 g/dL    Hematocrit 46.9 40.0 - 53.0 %    MCV 84 78 - 100 fL    MCH 28.7 26.5 - 33.0 pg    MCHC 34.1 31.5 - 36.5 g/dL    RDW 12.5 10.0 - 15.0 %    Platelet Count 362 150 - 450  10e3/uL    NRBCs per 100 WBC 0 <1 /100    Absolute NRBCs 0.0 10e3/uL   Manual Differential   Result Value Ref Range    % Neutrophils 76 %    % Lymphocytes 18 %    % Monocytes 5 %    % Eosinophils 0 %    % Basophils 0 %    % Metamyelocytes 1 %    Absolute Neutrophils 9.3 (H) 1.6 - 8.3 10e3/uL    Absolute Lymphocytes 2.2 0.8 - 5.3 10e3/uL    Absolute Monocytes 0.6 0.0 - 1.3 10e3/uL    Absolute Eosinophils 0.0 0.0 - 0.7 10e3/uL    Absolute Basophils 0.0 0.0 - 0.2 10e3/uL    Absolute Metamyelocytes 0.1 (H) <=0.0 10e3/uL    RBC Morphology Confirmed RBC Indices     Platelet Assessment  Automated Count Confirmed. Platelet morphology is normal.     Automated Count Confirmed. Platelet morphology is normal.    Reactive Lymphocytes Present (A) None Seen   ECG 12-LEAD WITH MUSE (LHE)   Result Value Ref Range    Systolic Blood Pressure  mmHg    Diastolic Blood Pressure  mmHg    Ventricular Rate 98 BPM    Atrial Rate 98 BPM    LA Interval 152 ms    QRS Duration 80 ms     ms    QTc 418 ms    P Axis 37 degrees    R AXIS 39 degrees    T Axis -1 degrees    Interpretation ECG       Sinus rhythm  Nonspecific T wave abnormality  Abnormal ECG  No previous ECGs available  Confirmed by SEE ED PROVIDER NOTE FOR, ECG INTERPRETATION ([id number]),  MEREDITH BAINS ([id number]) on 8/19/2024 8:53:21 PM         RADIOLOGY:  Reviewed all pertinent imaging. Please see official radiology report.  XR Chest 2 Views   Final Result   IMPRESSION: An ill-defined hazy opacity seen in the left lateral lung base suspicious for pneumonia. Mild atelectasis in the right lung base. No pleural effusion or pneumothorax. Heart size and pulmonary vascularity are within normal limits.      Chest CT w/o contrast    (Results Pending)       EKG:    Normal sinus rhythm.  Rate of 98.  Normal QRS.  Normal QT.  No ST or T wave changes.  No previous EKG available for comparison.    I have independently reviewed and interpreted the EKG(s) documented above.          Fly KLEIN  Douglas, am serving as a scribe to document services personally performed by Angel Mejia DO based on my observation and the provider's statements to me. I, Angel Mejia, attest that Fly Cole is acting in a scribe capacity, has observed my performance of the services and has documented them in accordance with my direction.    Angel Mejia DO  Emergency Medicine  [facility] EMERGENCY ROOM  formerly [facility] [facility] [id number] [phone]       Angel Mejia DO  08/20/24 0037

## 2024-08-20 NOTE — ED NOTES
Pt went to  on 8/16. Given inhaler, tessalon and azithromycin without improvement. Called nurse line and instructed to come to ED to r/o PE and pneumonia.

## 2024-08-20 NOTE — PLAN OF CARE
Problem: Pneumonia  Goal: Effective Oxygenation and Ventilation  Intervention: Promote Airway Secretion Clearance  Flowsheets  Taken 8/20/2024 1656  Breathing Techniques/Airway Clearance: deep/controlled cough encouraged  Cough And Deep Breathing: done independently per patient  Taken 8/20/2024 0912  Cough And Deep Breathing: done independently per patient  Intervention: Optimize Oxygenation and Ventilation  Recent Flowsheet Documentation  Taken 8/20/2024 1656 by Blanca Hernandez, RN  Airway/Ventilation Management: pulmonary hygiene promoted   Goal Outcome Evaluation:    A/Ox4. VSS. Needing 3-4 LO2 nasal cannula to maintain sats > 92%. Ambulating independently. Report called to 2E and patient transferred to room 2102.

## 2024-08-20 NOTE — PLAN OF CARE
Problem: Adult Inpatient Plan of Care  Goal: Optimal Comfort and Wellbeing  Outcome: Progressing   Patient's vitals stable on 2L nasal cannula. Patient was placed on 2L due to oxygen levels dipping to 89-90% on room air while asleep. PRN cough medication given for productive cough. IV patent and infusing fluids. Independent with ADLs. Will continue to monitor.

## 2024-08-20 NOTE — DISCHARGE INSTRUCTIONS
Please make an appointment to be seen in the pulmonary clinic with breathing tests (PFTs)    Pneumonia: Care Instructions  Overview     Pneumonia is an infection of the lungs. Most cases are caused by infections from bacteria or viruses.  Pneumonia may be mild or very severe. If it is caused by bacteria, you will be treated with antibiotics. It may take a few weeks to a few months to recover fully from pneumonia, depending on how sick you were and whether your overall health is good.  Follow-up care is a key part of your treatment and safety. Be sure to make and go to all appointments, and call your doctor if you are having problems. It's also a good idea to know your test results and keep a list of the medicines you take.  How can you care for yourself at home?  Take your antibiotics exactly as directed. Do not stop taking the medicine just because you are feeling better. You need to take the full course of antibiotics.  Take your medicines exactly as prescribed. Call your doctor if you think you are having a problem with your medicine.  Get plenty of rest and sleep. You may feel weak and tired for a while, but your energy level will improve with time.  To prevent dehydration, drink plenty of fluids. Choose water and other clear liquids. If you have kidney, heart, or liver disease and have to limit fluids, talk with your doctor before you increase the amount of fluids you drink.  Take care of your cough so you can rest. A cough that brings up mucus from your lungs is common with pneumonia. It is one way your body gets rid of the infection. But if coughing keeps you from resting or causes severe fatigue and chest-wall pain, talk to your doctor. Your doctor may suggest that you take a medicine to reduce the cough.  Use a vaporizer or humidifier to add moisture to your bedroom. Follow the directions for cleaning the machine.  Do not smoke or allow others to smoke around you. Smoke will make your cough last longer. If  "you need help quitting, talk to your doctor about stop-smoking programs and medicines. These can increase your chances of quitting for good.  Take an over-the-counter pain medicine, such as acetaminophen (Tylenol), ibuprofen (Advil, Motrin), or naproxen (Aleve). Read and follow all instructions on the label.  Do not take two or more pain medicines at the same time unless the doctor told you to. Many pain medicines have acetaminophen, which is Tylenol. Too much acetaminophen (Tylenol) can be harmful.  If you were given a spirometer to measure how well your lungs are working, use it as instructed. This can help your doctor tell how your recovery is going.  How can you prevent pneumonia or avoid getting it again?  To help prevent pneumonia, get the recommended pneumococcal vaccines and a yearly flu vaccine. And stay up to date on your COVID-19 vaccines. Wash your hands often to prevent spreading viruses and bacteria that may cause pneumonia. Taking care of your teeth and gums may help prevent some types of pneumonia.  When should you call for help?   Call 911 anytime you think you may need emergency care. For example, call if:    You have severe trouble breathing.   Call your doctor now or seek immediate medical care if:    You cough up dark brown or bloody mucus (sputum).     You have new or worse trouble breathing.     You are dizzy or lightheaded, or you feel like you may faint.   Watch closely for changes in your health, and be sure to contact your doctor if:    You have a new or higher fever.     You are coughing more deeply or more often.     You are not getting better after 2 days (48 hours).     You do not get better as expected.   Where can you learn more?  Go to https://www.[employer].net/patiented  Enter D336 in the search box to learn more about \"Pneumonia: Care Instructions.\"  Current as of: June 12, 2023               Content Version: 14.0    5032-5999 [employer].   Care instructions adapted " under license by your healthcare professional. If you have questions about a medical condition or this instruction, always ask your healthcare professional. [employer] disclaims any warranty or liability for your use of this information.      Learning About Using an Incentive Spirometer  What is an incentive spirometer?     An incentive spirometer is a handheld device that exercises your lungs and measures how much air you can breathe in. It tells you and your doctor how well your lungs are working.  The spirometer can help you practice taking deep breaths. Deep breaths can help open your airways and prevent fluid or mucus from building up in your lungs, and make it easier for you to breathe.  Using the device can help prevent serious lung infections like pneumonia, improve your breathing after you've had pneumonia or surgery, and keep your airways open and lungs active if you can't get out of bed.  How do you use an incentive spirometer?  When you use an incentive spirometer, you breathe in air through a tube that is connected to a large air column containing a piston or ball. As you breathe in, the piston or ball inside the column moves up. The height of the piston or ball shows how much air you breathed in.  You may feel lightheaded when you breathe in deeply for this exercise. If you feel dizzy or feel like you're going to pass out, stop the exercise and rest.  Each time you do this exercise, keep track of your progress by writing down how high the piston or ball moves up the column.  Move the slider on the outside of the large column to the level that you want to reach or that your doctor recommended.  Sit or stand up straight, and hold the spirometer in front of you.   Be sure to keep it level.  To start, breathe out normally. Then close your lips tightly around the mouthpiece.   Make sure that you don't block the mouthpiece with your tongue.  Take a slow, deep breath.   Breathe in as deeply as  "you can. As you breathe in, the piston or ball inside the large column will move up.  Try to move the piston or ball as high up as you can or to the level your doctor recommended.  When you can't breathe in anymore, hold your breath for 2 to 5 seconds.  Relax, take the mouthpiece out of your mouth, and breathe out normally.  Repeat steps 1 through 5 as many times as your doctor tells you to.  After you've taken the recommended number of breaths, try to cough a few times.   This will help loosen any mucus that has built up in your lungs. It will make it easier for you to breathe. If you just had surgery on your belly or chest, hold a pillow over your cut (incision) when you cough.  Follow-up care is a key part of your treatment and safety. Be sure to make and go to all appointments, and call your doctor if you are having problems. It's also a good idea to know your test results and keep a list of the medicines you take.  Where can you learn more?  Go to https://www.[employer].net/patiented  Enter B979 in the search box to learn more about \"Learning About Using an Incentive Spirometer.\"  Current as of: August 6, 2023  Content Version: 14.1 2006-2024 [employer].   Care instructions adapted under license by your healthcare professional. If you have questions about a medical condition or this instruction, always ask your healthcare professional. [employer] disclaims any warranty or liability for your use of this information.     How to Use an Incentive Spirometer (01:25)  Your health professional recommends that you watch this short online health video.  Learn how to use an incentive spirometer to improve the health of your lungs.   Purpose: Gives instructions for using an incentive spirometer to improve lung health.  Goal: The user will learn how to use an incentive spirometer to improve lung health.    Watch: Scan the QR code or visit the link to view video   "     https://i.se/[email or web address]  Current as of: August 6, 2023  Content Version: 14.1 2006-2024 [employer].   Care instructions adapted under license by your healthcare professional. If you have questions about a medical condition or this instruction, always ask your healthcare professional. [employer] disclaims any warranty or liability for your use of this information.

## 2024-08-20 NOTE — MEDICATION SCRIBE - ADMISSION MEDICATION HISTORY
Medication Scribe Admission Medication History    Admission medication history is complete. The information provided in this note is only as accurate as the sources available at the time of the update.    Information Source(s): Patient via phone    Pertinent Information: PTA med list updated per patient over the phone.    Changes made to PTA medication list:  Added: Albuterol, Benzonatate, Cefprozil, Clonazepam, Prednisone, Pregabalin(per patient and fill history)  Deleted: None  Changed: None    Allergies reviewed with patient and updates made in EHR: yes    Medication History Completed By: Jim Cespedes 8/20/2024 12:47 AM    PTA Med List   Medication Sig Last Dose    albuterol (PROAIR HFA/PROVENTIL HFA/VENTOLIN HFA) 108 (90 Base) MCG/ACT inhaler Inhale 1-2 puffs into the lungs every 6 hours as needed for shortness of breath Unknown at prn    benzonatate (TESSALON) 200 MG capsule Take 200 mg by mouth 3 times daily as needed for cough Unknown at prn    cefPROZIL (CEFZIL) 500 MG tablet Take 500 mg by mouth 2 times daily 8/19/2024 at pm    clonazePAM (KLONOPIN) 2 MG tablet Take 2 mg by mouth 3 times daily 8/18/2024 at pm    HYDROcodone-acetaminophen 5-325 mg per tablet [HYDROCODONE-ACETAMINOPHEN 5-325 MG PER TABLET] Take 1 tablet by mouth every 4 (four) hours as needed for pain. Unknown at prn    predniSONE (DELTASONE) 20 MG tablet Take 3 tablets by mouth daily 8/19/2024 at am    pregabalin (LYRICA) 150 MG capsule Take 150 mg by mouth daily 8/19/2024 at am

## 2024-08-20 NOTE — PROGRESS NOTES
[facility]    Medicine Progress Note - Hospitalist Service    Date of Admission:  8/20/2024    Assessment & Plan   32 yo male presenting with left sided community acquired pneumonia.       Reactive airway disease  Mucous plugging  Possible left lower lobe community acqurired pneumonia.   CT with airway disease with bronchial wall thickening, endobronchial mucous plugs of lingula and LLL, and associated atelectasis. Mosaic attenuation of lung parenchyma consistent with small airways disease.   - Respiratory panel, urine antigens, and respiratory culture in process.   - Continue levofloxacin 750 mg IV daily.   - albuterol 2.5 mg q4h prn  - start Breo Ellipta, start Symbicort at discharge.   - prednisone 40 mg daily  - guaifenesin 600 mg po BID     Recently treated for left otitis media.   Cefzil BID started on 8/16/24.   Prednisone burst 60 mg daily for 5 days on 8/16/24.     Chronic low back pain  History of lumbar radicular pain  S/P right L5 transforaminal epidural steroid injection on 6/11/24    Restless leg syndrome  Clonazepam 2 mg TID    Insomnia, idiopathic       Observation Goals:   Diet: Regular Diet Adult    DVT Prophylaxis: Low Risk/Ambulatory with no VTE prophylaxis indicated  Rogers Catheter: Not present  Lines: None     Cardiac Monitoring: None  Code Status: Full Code      Clinically Significant Risk Factors Present on Admission                          # Overweight: Estimated body mass index is 29.35 kg/m  as calculated from the following:    Height as of this encounter: 1.829 m (6').    Weight as of this encounter: 98.2 kg (216 lb 6.4 oz).                    Disposition Plan     Medically Ready for Discharge: Anticipated Tomorrow             Dmitri Burns DO  Hospitalist Service  [facility]  Securely message with [employer] (more info)  Text page via [facility] Paging/Directory    ______________________________________________________________________    Interval History   Patient report ongoing shortness of breath and productive cough.  Denies fever or chills.  Tolerating diet.     Physical Exam   Vital Signs: Temp: 97.7  F (36.5  C) Temp src: Oral BP: 120/67 Pulse: 69   Resp: 18 SpO2: 92 % O2 Device: Nasal cannula Oxygen Delivery: 2 LPM  Weight: 216 lbs 6.4 oz    General Appearance:  Alert, cooperative, no distress  Head:    Normocephalic, without obvious abnormality, atraumatic  EENT:  PERRL, conjunctiva/corneas clear, EOM's intact.   Neck:    Supple, symmetrical, trachea midline, no adenopathy; no NVE  Back:  Symmetric, no curvature, no CVA tenderness  Chest/Lungs: decreased air entry, (+)  rhonchi, no wheezes, respirations unlabored, No tenderness or deformity. No abdominal breathing or use of accessory muscles.   Heart:    Regular rate and rhythm, S1 and S2 normal, no murmur, rub   or gallop  Abdomen: Soft, non-tender, bowel sounds active all four quadrants, not peritoneal on palpation. Not distended  Extremities:  Extremities normal, atraumatic, no swelling   Skin:  Skin color, texture, turgor normal, no rashes or lesion  Neurologic:  Awake and alert, No lateralizing or localizing signs     Medical Decision Making       40 MINUTES SPENT BY ME on the date of service doing chart review, history, exam, documentation & further activities per the note.      Data     I have personally reviewed the following data over the past 24 hrs:    10.8  \   14.4   / 301     137 103 18.9 /  161 (H)   4.7 22 0.89 \     ALT: 69 AST: 31 AP: 86 TBILI: 0.3   ALB: 4.1 TOT PROTEIN: 6.9 LIPASE: N/A     Trop: 8 BNP: 47     Procal: 0.13 CRP: N/A Lactic Acid: N/A         Imaging results reviewed over the past 24 hrs:   Recent Results (from the past 24 hour(s))   XR Chest 2 Views    Narrative    EXAM: XR CHEST 2 VIEWS  LOCATION: [facility]  DATE: 8/19/2024    INDICATION: persistent  product cough  COMPARISON: None.      Impression    IMPRESSION: An ill-defined hazy opacity seen in the left lateral lung base suspicious for pneumonia. Mild atelectasis in the right lung base. No pleural effusion or pneumothorax. Heart size and pulmonary vascularity are within normal limits.   CT Chest Pulmonary Embolism w Contrast    Narrative    EXAM: CT CHEST PULMONARY EMBOLISM W CONTRAST  LOCATION: [facility]  DATE: 8/20/2024    INDICATION: cough and shortness of breath  COMPARISON: None.  TECHNIQUE: CT chest pulmonary angiogram during arterial phase injection of IV contrast. Multiplanar reformats and MIP reconstructions were performed. Dose reduction techniques were used.   CONTRAST: [id number] 75ml    FINDINGS:  ANGIOGRAM CHEST: Pulmonary arteries are normal caliber and negative for pulmonary emboli. Thoracic aorta is negative for dissection. No CT evidence of right heart strain.    LUNGS AND PLEURA: Bronchial wall thickening and endobronchial mucous plugs present especially within left lower lobe and lingula. There are bands of parenchymal opacity in the same distribution likely atelectasis though developing pneumonia also   possible. Mosaic attenuation of lung parenchyma consistent small airways disease.    MEDIASTINUM/AXILLAE: Normal.    CORONARY ARTERY CALCIFICATION: None.    UPPER ABDOMEN: Normal.    MUSCULOSKELETAL: Normal.      Impression    IMPRESSION:  1.  No pulmonary emboli.  2.  Airway disease characterized by bronchial wall thickening and endobronchial mucous plugs especially involving lingula and left lower lobe with associated atelectasis. Mosaic attenuation of lung parenchyma also consistent with small airways disease.

## 2024-08-20 NOTE — ED TRIAGE NOTES
Arrives to ED with c/o SOB, cough and CP that began last week Wednesday. Endorses green/brown sputum. Reports burning to chest worse after coughing. Denies cardiopulmonary hx. Negative COVID test x2.      Triage Assessment (Adult)       Row Name 08/19/24 2029          Triage Assessment    Airway WDL WDL        Respiratory WDL    Respiratory WDL X;rhythm/pattern;cough     Rhythm/Pattern, Respiratory shortness of breath        Skin Circulation/Temperature WDL    Skin Circulation/Temperature WDL WDL        Cardiac WDL    Cardiac WDL X;chest pain        Peripheral/Neurovascular WDL    Peripheral Neurovascular WDL WDL        Cognitive/Neuro/Behavioral WDL    Cognitive/Neuro/Behavioral WDL WDL

## 2024-08-21 PROCEDURE — 250N000013 HC RX MED GY IP 250 OP 250 PS 637: Performed by: INTERNAL MEDICINE

## 2024-08-21 PROCEDURE — 250N000012 HC RX MED GY IP 250 OP 636 PS 637: Performed by: INTERNAL MEDICINE

## 2024-08-21 PROCEDURE — 120N000001 HC R&B MED SURG/OB

## 2024-08-21 PROCEDURE — 258N000003 HC RX IP 258 OP 636: Performed by: INTERNAL MEDICINE

## 2024-08-21 PROCEDURE — 250N000011 HC RX IP 250 OP 636: Performed by: INTERNAL MEDICINE

## 2024-08-21 PROCEDURE — 99232 SBSQ HOSP IP/OBS MODERATE 35: CPT | Performed by: INTERNAL MEDICINE

## 2024-08-21 RX ADMIN — FLUTICASONE FUROATE AND VILANTEROL TRIFENATATE 1 PUFF: 100; 25 POWDER RESPIRATORY (INHALATION) at 08:28

## 2024-08-21 RX ADMIN — BENZONATATE 100 MG: 100 CAPSULE ORAL at 23:17

## 2024-08-21 RX ADMIN — CLONAZEPAM 2 MG: 0.5 TABLET ORAL at 13:41

## 2024-08-21 RX ADMIN — GUAIFENESIN AND DEXTROMETHORPHAN 10 ML: 100; 10 SYRUP ORAL at 00:18

## 2024-08-21 RX ADMIN — CLONAZEPAM 2 MG: 0.5 TABLET ORAL at 23:17

## 2024-08-21 RX ADMIN — PREDNISONE 40 MG: 20 TABLET ORAL at 08:22

## 2024-08-21 RX ADMIN — LEVOFLOXACIN 750 MG: 750 INJECTION, SOLUTION INTRAVENOUS at 23:09

## 2024-08-21 RX ADMIN — GUAIFENESIN 600 MG: 600 TABLET ORAL at 08:22

## 2024-08-21 RX ADMIN — GUAIFENESIN 600 MG: 600 TABLET ORAL at 20:07

## 2024-08-21 RX ADMIN — CLONAZEPAM 2 MG: 0.5 TABLET ORAL at 08:22

## 2024-08-21 RX ADMIN — SODIUM CHLORIDE: 9 INJECTION, SOLUTION INTRAVENOUS at 05:32

## 2024-08-21 RX ADMIN — GUAIFENESIN AND DEXTROMETHORPHAN 10 ML: 100; 10 SYRUP ORAL at 23:17

## 2024-08-21 RX ADMIN — PREGABALIN 150 MG: 75 CAPSULE ORAL at 08:22

## 2024-08-21 ASSESSMENT — ACTIVITIES OF DAILY LIVING (ADL)
ADLS_ACUITY_SCORE: 18

## 2024-08-21 NOTE — PLAN OF CARE
VSS. Denies pain. Maintaining O2 on 2L NC. Getting  mL/hr and Zosyn. Coarse lung sounds bilaterally. Some dyspnea with exertion. Ambulating and voiding independently.      Problem: Adult Inpatient Plan of Care  Goal: Optimal Comfort and Wellbeing  Outcome: Progressing     Problem: Adult Inpatient Plan of Care  Goal: Readiness for Transition of Care  Outcome: Progressing  Intervention: Mutually Develop Transition Plan  Recent Flowsheet Documentation  Taken 8/20/2024 2100 by Lizbeth Duke, RN  Equipment Currently Used at Home: none     Problem: Gas Exchange Impaired  Goal: Optimal Gas Exchange  Outcome: Progressing

## 2024-08-21 NOTE — PLAN OF CARE
Problem: Adult Inpatient Plan of Care  Goal: Plan of Care Review  Description: The Plan of Care Review/Shift note should be completed every shift.  The Outcome Evaluation is a brief statement about your assessment that the patient is improving, declining, or no change.  This information will be displayed automatically on your shift  note.  Outcome: Progressing  Flowsheets (Taken 8/21/2024 1342)  Plan of Care Reviewed With: patient     Problem: Pneumonia  Goal: Fluid Balance  Outcome: Progressing  Goal: Resolution of Infection Signs and Symptoms  Outcome: Progressing  Goal: Effective Oxygenation and Ventilation  Outcome: Progressing  Intervention: Promote Airway Secretion Clearance  Recent Flowsheet Documentation  Taken 8/21/2024 0800 by Shayne Alonso, RN  Cough And Deep Breathing: done independently per patient     Problem: Gas Exchange Impaired  Goal: Optimal Gas Exchange  Outcome: Progressing     Goal Outcome Evaluation:      Plan of Care Reviewed With: patient

## 2024-08-21 NOTE — PLAN OF CARE
Goal Outcome Evaluation:    [id number]     Pt vitals stable. He is currently on oxygen, Nasal canula 2 LPM. He is on continuous pulse ox. He is independent in the room on a regular diet. He has a frequent productive cough. He denies any pain. IV saline locked. Pt encouraged to stay hydrated.          Problem: Adult Inpatient Plan of Care  Goal: Absence of Hospital-Acquired Illness or Injury  Outcome: Progressing  Intervention: Prevent Skin Injury  Recent Flowsheet Documentation  Taken 8/21/2024 1716 by Grace Dumont RN  Body Position: position changed independently  Intervention: Prevent Infection  Recent Flowsheet Documentation  Taken 8/21/2024 1716 by Grace Dumont, RN  Infection Prevention: hand hygiene promoted     Problem: Gas Exchange Impaired  Goal: Optimal Gas Exchange  Outcome: Progressing     Problem: Pneumonia  Goal: Effective Oxygenation and Ventilation  Outcome: Progressing  Intervention: Promote Airway Secretion Clearance  Recent Flowsheet Documentation  Taken 8/21/2024 1716 by Grace Dumont, RN  Cough And Deep Breathing: done independently per patient     Problem: Adult Inpatient Plan of Care  Goal: Optimal Comfort and Wellbeing  Outcome: Progressing

## 2024-08-21 NOTE — PROGRESS NOTES
[facility]    Medicine Progress Note - Hospitalist Service    Date of Admission:  8/20/2024    Assessment & Plan   34 yo male presenting with left sided community acquired pneumonia.       Reactive airway disease  Mucous plugging  Community acqurired pneumonia secondary to Mycoplasma pneumoniae.   CT with airway disease with bronchial wall thickening, endobronchial mucous plugs of lingula and LLL, and associated atelectasis. Mosaic attenuation of lung parenchyma consistent with small airways disease.   - Respiratory panel positive for Mycoplasma pneumoniae.  - Continue levofloxacin 750 mg IV daily for 7 days.   - albuterol 2.5 mg q4h prn  - Breo Ellipta, start Symbicort at discharge.   - prednisone 40 mg daily  - guaifenesin 600 mg po BID     Recently treated for left otitis media.   Cefzil BID started on 8/16/24.   Prednisone burst 60 mg daily for 5 days on 8/16/24.     Chronic low back pain  History of lumbar radicular pain  S/P right L5 transforaminal epidural steroid injection on 6/11/24    Restless leg syndrome  Clonazepam 2 mg TID    Insomnia, idiopathic          Diet: Regular Diet Adult    DVT Prophylaxis: Low Risk/Ambulatory with no VTE prophylaxis indicated  Rogers Catheter: Not present  Lines: None     Cardiac Monitoring: None  Code Status: Full Code      Clinically Significant Risk Factors Present on Admission                          # Overweight: Estimated body mass index is 29.35 kg/m  as calculated from the following:    Height as of this encounter: 1.829 m (6').    Weight as of this encounter: 98.2 kg (216 lb 6.4 oz).                    Disposition Plan     Medically Ready for Discharge: Anticipated Tomorrow             Dmitri Burns DO  Hospitalist Service  [facility]  Securely message with [employer] (more info)  Text page via [facility] Paging/Directory   ______________________________________________________________________    Interval History    Patient reports increased cough and shortness of breath with activity to and from bathroom.  Feels a little better this morning.  Continued productive cough.     Physical Exam   Vital Signs: Temp: 97.6  F (36.4  C) Temp src: Oral BP: 116/70 Pulse: 78   Resp: 16 SpO2: 93 % O2 Device: Nasal cannula Oxygen Delivery: 2 LPM  Weight: 216 lbs 6.4 oz    General Appearance:  Alert, cooperative, no distress  Head:    NC, without obvious abnormality, atraumatic  Chest/Lungs: decreased air entry, no wheezing, respirations unlabored, No tenderness or deformity. No abdominal breathing or use of accessory muscles.    Heart:    RRR, S1 and S2 normal, no murmur.  Abdomen: Soft, non-tender, bowel sounds active all four quadrants, not peritoneal on palpation. Not distended  Extremities:  Extremities normal, atraumatic, no swelling   Skin:  Skin color, texture, turgor normal, no rashes or lesion  Neurologic:  Awake and alert, No lateralizing or localizing signs     Medical Decision Making       40 MINUTES SPENT BY ME on the date of service doing chart review, history, exam, documentation & further activities per the note.      Data         Imaging results reviewed over the past 24 hrs:   No results found for this or any previous visit (from the past 24 hour(s)).

## 2024-08-22 LAB
BACTERIA SPT CULT: NORMAL
GRAM STAIN RESULT: NORMAL

## 2024-08-22 PROCEDURE — 250N000009 HC RX 250: Performed by: INTERNAL MEDICINE

## 2024-08-22 PROCEDURE — 250N000013 HC RX MED GY IP 250 OP 250 PS 637: Performed by: INTERNAL MEDICINE

## 2024-08-22 PROCEDURE — 250N000012 HC RX MED GY IP 250 OP 636 PS 637: Performed by: INTERNAL MEDICINE

## 2024-08-22 PROCEDURE — 94640 AIRWAY INHALATION TREATMENT: CPT

## 2024-08-22 PROCEDURE — 250N000011 HC RX IP 250 OP 636: Performed by: INTERNAL MEDICINE

## 2024-08-22 PROCEDURE — 120N000001 HC R&B MED SURG/OB

## 2024-08-22 PROCEDURE — 99232 SBSQ HOSP IP/OBS MODERATE 35: CPT | Performed by: INTERNAL MEDICINE

## 2024-08-22 RX ORDER — ALBUTEROL SULFATE 0.83 MG/ML
SOLUTION RESPIRATORY (INHALATION)
Status: COMPLETED
Start: 2024-08-22 | End: 2024-08-22

## 2024-08-22 RX ORDER — ALBUTEROL SULFATE 0.83 MG/ML
2.5 SOLUTION RESPIRATORY (INHALATION) EVERY 4 HOURS PRN
Status: DISCONTINUED | OUTPATIENT
Start: 2024-08-22 | End: 2024-08-23 | Stop reason: HOSPADM

## 2024-08-22 RX ADMIN — CLONAZEPAM 2 MG: 0.5 TABLET ORAL at 13:40

## 2024-08-22 RX ADMIN — GUAIFENESIN 600 MG: 600 TABLET ORAL at 08:36

## 2024-08-22 RX ADMIN — CLONAZEPAM 2 MG: 0.5 TABLET ORAL at 08:36

## 2024-08-22 RX ADMIN — LEVOFLOXACIN 750 MG: 750 INJECTION, SOLUTION INTRAVENOUS at 23:34

## 2024-08-22 RX ADMIN — Medication 3 MG: at 23:41

## 2024-08-22 RX ADMIN — ACETAMINOPHEN 650 MG: 325 TABLET ORAL at 06:50

## 2024-08-22 RX ADMIN — GUAIFENESIN 600 MG: 600 TABLET ORAL at 20:41

## 2024-08-22 RX ADMIN — ALBUTEROL SULFATE 2.5 MG: 2.5 SOLUTION RESPIRATORY (INHALATION) at 12:08

## 2024-08-22 RX ADMIN — GUAIFENESIN AND DEXTROMETHORPHAN 10 ML: 100; 10 SYRUP ORAL at 06:47

## 2024-08-22 RX ADMIN — BENZONATATE 100 MG: 100 CAPSULE ORAL at 23:34

## 2024-08-22 RX ADMIN — ACETAMINOPHEN 650 MG: 325 TABLET ORAL at 20:48

## 2024-08-22 RX ADMIN — ACETAMINOPHEN 650 MG: 325 TABLET ORAL at 13:40

## 2024-08-22 RX ADMIN — FLUTICASONE FUROATE AND VILANTEROL TRIFENATATE 1 PUFF: 100; 25 POWDER RESPIRATORY (INHALATION) at 08:34

## 2024-08-22 RX ADMIN — CLONAZEPAM 2 MG: 0.5 TABLET ORAL at 23:34

## 2024-08-22 RX ADMIN — PREGABALIN 150 MG: 75 CAPSULE ORAL at 08:36

## 2024-08-22 RX ADMIN — PREDNISONE 40 MG: 20 TABLET ORAL at 08:36

## 2024-08-22 ASSESSMENT — ACTIVITIES OF DAILY LIVING (ADL)
ADLS_ACUITY_SCORE: 18

## 2024-08-22 NOTE — PROGRESS NOTES
[facility]    Medicine Progress Note - Hospitalist Service    Date of Admission:  8/20/2024    Assessment & Plan   34 yo male presenting with reactive airway disease unspecified, and left sided community acquired pneumonia secondary to Myoplasma pneumoniae.   Patient with acute respiratory failure with hypoxia.  Continuing respiratory cares and antibiotics, with progressive activity as tolerated.  Hopefully discharge to home soon, likely 8/23/24.     Reactive airway disease  Mucous plugging  Community acqurired pneumonia secondary to Mycoplasma pneumoniae.   Acute respiratory failure with hypoxia.   CT with airway disease with bronchial wall thickening, endobronchial mucous plugs of lingula and LLL, and associated atelectasis. Mosaic attenuation of lung parenchyma consistent with small airways disease.   - Respiratory panel positive for Mycoplasma pneumoniae.  - Continue levofloxacin 750 mg IV daily for 7-10 days (started 8/20/24).   - albuterol 2.5 mg q4h prn  - Breo Ellipta, start Symbicort at discharge.   - prednisone 40 mg daily (8/20/24-8/24/24).  - guaifenesin 600 mg po BID   - Albuterol neb today.     Chronic low back pain  History of lumbar radicular pain  S/P right L5 transforaminal epidural steroid injection on 6/11/24    Restless leg syndrome  Insomnia, idiopathic  Clonazepam 2 mg TID        Diet: Regular Diet Adult    DVT Prophylaxis: Low Risk/Ambulatory with no VTE prophylaxis indicated  Rogers Catheter: Not present  Lines: None     Cardiac Monitoring: None  Code Status: Full Code      Clinically Significant Risk Factors                            # Overweight: Estimated body mass index is 29.35 kg/m  as calculated from the following:    Height as of this encounter: 1.829 m (6').    Weight as of this encounter: 98.2 kg (216 lb 6.4 oz)., PRESENT ON ADMISSION          Disposition Plan     Medically Ready for Discharge: Anticipated Tomorrow     Dmitri Burns DO  Hospitalist  Service  [facility]  Securely message with [employer] (more info)  Text page via [employer] Paging/Directory   ______________________________________________________________________    Interval History   Waqar reports feeling a little better today.  Continues to have productive cough, and was able to get up to bathroom and back to bed.  He is currently on supplemental oxygen at 2 L/min via nasal cannula.      Physical Exam   Vital Signs: Temp: 97.9  F (36.6  C) Temp src: Oral BP: 110/59 Pulse: 78   Resp: 18 SpO2: 93 % O2 Device: Nasal cannula Oxygen Delivery: 2 LPM  Weight: 216 lbs 6.4 oz    General Appearance:  Alert, cooperative, no distress  Head:    NC, without obvious abnormality, atraumatic  Chest/Lungs: occasional rhonchi or wheezing in upper lung fields, respirations unlabored, No tenderness or deformity. No abdominal breathing or use of accessory muscles.    Heart:    RRR, S1 and S2 normal, no murmur.  Abdomen: Soft, non-tender, bowel sounds active all four quadrants, not peritoneal on palpation. Not distended  Extremities:  Extremities normal, atraumatic, no swelling   Skin:  Skin color, texture, turgor normal, no rashes or lesion  Neurologic:  Awake and alert, No lateralizing or localizing signs     Medical Decision Making       40 MINUTES SPENT BY ME on the date of service doing chart review, history, exam, documentation & further activities per the note.      Data         Imaging results reviewed over the past 24 hrs:   No results found for this or any previous visit (from the past 24 hour(s)).

## 2024-08-22 NOTE — PLAN OF CARE
Goal: Absence of Hospital-Acquired Illness or Injury  Outcome: Progressing  Intervention: Prevent Skin Injury  Recent Flowsheet Documentation  Taken 8/21/2024 2325 by Airam Washington RN  Body Position: position changed independently  Taken 8/21/2024 2000 by Airam Washington RN  Body Position: position changed independently  Intervention: Prevent Infection  Recent Flowsheet Documentation  Taken 8/21/2024 2000 by Airam Washington RN  Infection Prevention: hand hygiene promoted  Goal: Optimal Comfort and Wellbeing  Outcome: Progressing  Goal: Readiness for Transition of Care  Outcome: Progressing     Problem: Pneumonia  Goal: Fluid Balance  Outcome: Progressing  Goal: Resolution of Infection Signs and Symptoms  Outcome: Progressing  Goal: Effective Oxygenation and Ventilation  Outcome: Progressing  Intervention: Promote Airway Secretion Clearance  Recent Flowsheet Documentation  Taken 8/21/2024 2000 by Airam Washington RN  Cough And Deep Breathing: done independently per patient  Intervention: Optimize Oxygenation and Ventilation  Recent Flowsheet Documentation  Taken 8/21/2024 2325 by Airam Washington RN  Head of Bed (HOB) Positioning: HOB at 30 degrees     Problem: Gas Exchange Impaired  Goal: Optimal Gas Exchange  Outcome: Progressing  Intervention: Optimize Oxygenation and Ventilation  Recent Flowsheet Documentation  Taken 8/21/2024 2325 by Airam Washington RN  Head of Bed (HOB) Positioning: HOB at 30 degrees   Goal Outcome Evaluation:       Patient VSS. O2 on 2L NC, maintaining O2 at above 92%. Patient frequently having productive cough with diminished and course lung sounds. Cough treated with guaifenesin, robitussin and tessalon with some relief. Patient denies pain from coughing. Patient did express dull stinging left ear pain similar to ear infection pain he states he had 1 week ago. Patient up independently. Voiding adequately. Care and education on-going.  Airam Washington RN

## 2024-08-22 NOTE — PLAN OF CARE
Goal Outcome Evaluation:       Pt vitals stable. He is independent in the room on a regular diet. He is on 2 LPM using an Oxymask. He is on continuous pulse ox monitoring. He has been sating in the low 90's. He dipped below 90 couple times today while on 2 L oxygen but has maintained it above 90 after switching to the oxymask. He had minimal pain during shift. Tylenol administered.    His lung sounds are coarse and diminished with expiratory wheezing. Breathing treatment done by RT. He has had a frequent productive cough during shift.      His IV is saline locked.         Problem: Gas Exchange Impaired  Goal: Optimal Gas Exchange  8/22/2024 1551 by Grace Dumont, RN  Outcome: Progressing  8/22/2024 1342 by Grace Dumont, RN  Outcome: Progressing     Problem: Adult Inpatient Plan of Care  Goal: Optimal Comfort and Wellbeing  8/22/2024 1551 by Grace Dumont, RN  Outcome: Progressing  8/22/2024 1342 by Grace Dumont, RN  Outcome: Progressing     Problem: Adult Inpatient Plan of Care  Goal: Readiness for Transition of Care  8/22/2024 1551 by Grace Dumont, RN  Outcome: Progressing  8/22/2024 1342 by Grace Dumont, RN  Outcome: Progressing

## 2024-08-23 VITALS
DIASTOLIC BLOOD PRESSURE: 56 MMHG | BODY MASS INDEX: 28.74 KG/M2 | TEMPERATURE: 97.9 F | HEIGHT: 72 IN | SYSTOLIC BLOOD PRESSURE: 99 MMHG | HEART RATE: 64 BPM | WEIGHT: 212.2 LBS | OXYGEN SATURATION: 90 % | RESPIRATION RATE: 16 BRPM

## 2024-08-23 PROCEDURE — 94640 AIRWAY INHALATION TREATMENT: CPT

## 2024-08-23 PROCEDURE — 99239 HOSP IP/OBS DSCHRG MGMT >30: CPT | Performed by: INTERNAL MEDICINE

## 2024-08-23 PROCEDURE — 999N000157 HC STATISTIC RCP TIME EA 10 MIN

## 2024-08-23 PROCEDURE — 250N000012 HC RX MED GY IP 250 OP 636 PS 637: Performed by: INTERNAL MEDICINE

## 2024-08-23 PROCEDURE — 250N000013 HC RX MED GY IP 250 OP 250 PS 637: Performed by: INTERNAL MEDICINE

## 2024-08-23 PROCEDURE — 250N000009 HC RX 250: Performed by: INTERNAL MEDICINE

## 2024-08-23 RX ORDER — PREDNISONE 20 MG/1
40 TABLET ORAL DAILY
Qty: 4 TABLET | Refills: 0 | Status: SHIPPED | OUTPATIENT
Start: 2024-08-24 | End: 2024-08-26

## 2024-08-23 RX ORDER — BUDESONIDE AND FORMOTEROL FUMARATE DIHYDRATE 160; 4.5 UG/1; UG/1
2 AEROSOL RESPIRATORY (INHALATION) 2 TIMES DAILY
Qty: 10.2 G | Refills: 0 | Status: SHIPPED | OUTPATIENT
Start: 2024-08-23 | End: 2024-09-22

## 2024-08-23 RX ORDER — LEVOFLOXACIN 750 MG/1
750 TABLET, FILM COATED ORAL DAILY
Qty: 5 TABLET | Refills: 0 | Status: SHIPPED | OUTPATIENT
Start: 2024-08-23 | End: 2024-08-28

## 2024-08-23 RX ORDER — LEVOFLOXACIN 5 MG/ML
750 INJECTION, SOLUTION INTRAVENOUS EVERY 24 HOURS
Qty: 5 EACH | Refills: 0 | Status: SHIPPED | OUTPATIENT
Start: 2024-08-23 | End: 2024-08-23

## 2024-08-23 RX ADMIN — GUAIFENESIN AND DEXTROMETHORPHAN 10 ML: 100; 10 SYRUP ORAL at 04:35

## 2024-08-23 RX ADMIN — ALBUTEROL SULFATE 2.5 MG: 2.5 SOLUTION RESPIRATORY (INHALATION) at 10:36

## 2024-08-23 RX ADMIN — GUAIFENESIN 600 MG: 600 TABLET ORAL at 09:08

## 2024-08-23 RX ADMIN — PREDNISONE 40 MG: 20 TABLET ORAL at 09:08

## 2024-08-23 RX ADMIN — CLONAZEPAM 2 MG: 0.5 TABLET ORAL at 09:08

## 2024-08-23 RX ADMIN — PREGABALIN 150 MG: 75 CAPSULE ORAL at 09:08

## 2024-08-23 RX ADMIN — FLUTICASONE FUROATE AND VILANTEROL TRIFENATATE 1 PUFF: 100; 25 POWDER RESPIRATORY (INHALATION) at 09:11

## 2024-08-23 ASSESSMENT — ACTIVITIES OF DAILY LIVING (ADL)
ADLS_ACUITY_SCORE: 18

## 2024-08-23 NOTE — PLAN OF CARE
Pt discharged around 1215. Discharge education completed and AVS reviewed. Pt verbalized understanding and had no further questions or concerns. Pt was escorted by hospital staff to front lobby.

## 2024-08-23 NOTE — PROGRESS NOTES
Pt seen on RA sati 90's. Pt received PRN Albuterol neb treatment per pt request. BS clear pre and post treatment.

## 2024-08-23 NOTE — PROGRESS NOTES
Patient has been assessed for Home Oxygen needs. Oxygen readings:    *Pulse oximetry (SpO2) = 90% on room air at rest while awake.    *SpO2 = 90% on room air during activity/with exercise.    Updated Dr. Rodgers that pt does not qualify for home O2. Plan is to discharge pt to home sometime soon.

## 2024-08-23 NOTE — DISCHARGE SUMMARY
[facility]  Hospitalist Discharge Summary      Date of Admission:  8/20/2024  Date of Discharge:  8/23/2024 12:17 PM  Discharging Provider: Yoana Rodgers DO  Discharge Service: Hospitalist Service    Discharge Diagnoses   Acute hypoxic respiratory failure  Mycoplasma pneumonia  Reactive airway disease  Community-acquired pneumonia    Clinically Significant Risk Factors     # Overweight: Estimated body mass index is 28.78 kg/m  as calculated from the following:    Height as of this encounter: 1.829 m (6').    Weight as of this encounter: 96.3 kg (212 lb 3.2 oz).       Follow-ups Needed After Discharge   Follow-up Appointments     Follow-up and recommended labs and tests       Follow up with primary care provider, Isaac Junior, within 3-4   days .    Follow up with pulmonology for outpatient PFTs            Unresulted Labs Ordered in the Past 30 Days of this Admission       Date and Time Order Name Status Description    8/20/2024 12:35 AM Blood Culture Peripheral Blood Preliminary         These results will be [place] [facility]    Discharge Disposition   Discharged to home  Condition at discharge: Stable    Hospital Course   Patient is a 33-year-old male who presented with acute hypoxic respiratory failure found to be secondary to mycoplasma pneumonia.  He was treated with prednisone and Levaquin with improvement of symptoms.  Underwent oxygen testing at discharge and did not qualify for home oxygen.  Plan will be to follow-up with pulmonology for outpatient PFTs.  Stable at time of discharge.    Consultations This Hospital Stay   None    Code Status   Full Code    Time Spent on this Encounter   I, Yoana Rodgers DO, personally saw the patient today and spent greater than 30 minutes discharging this patient.       DO [facility] [address] [address]  Phone: [phone]  Fax:  [phone]  ______________________________________________________________________    Physical Exam   Vital Signs: Temp: 97.9  F (36.6  C) Temp src: Oral BP: 99/56 Pulse: 64   Resp: 16 SpO2: 90 % O2 Device: None (Room air) Oxygen Delivery: 2 LPM  Weight: 212 lbs 3.2 oz         Primary Care Physician   Isaac Junior    Discharge Orders      Adult Pulmonary Medicine  Referral      Reason for your hospital stay    Mycoplasma pneumonia     Follow-up and recommended labs and tests     Follow up with primary care provider, Isaac Junior, within 3-4 days .    Follow up with pulmonology for outpatient PFTs     Activity    Your activity upon discharge: activity as tolerated     General PFT Lab (Please always keep checked)     Pulmonary Function Test    .     Diet    Follow this diet upon discharge: Current Diet:Orders Placed This Encounter      Regular Diet Adult       Significant Results and Procedures   Most Recent 3 CBC's:  Recent Labs   Lab Test 08/20/24  0620 08/19/24 2038   WBC 10.8 12.2*   HGB 14.4 16.0   MCV 84 84    362     Most Recent 3 BMP's:  Recent Labs   Lab Test 08/20/24  1110 08/20/24  0620 08/19/24 2038   NA  --  137 139   POTASSIUM  --  4.7 4.4   CHLORIDE  --  103 101   CO2  --  22 25   BUN  --  18.9 22.2*   CR  --  0.89 1.08   ANIONGAP  --  12 13   BA  --  9.4 10.4   * 161* 153*     Most Recent 2 LFT's:  Recent Labs   Lab Test 08/20/24  0620   AST 31   ALT 69   ALKPHOS 86   BILITOTAL 0.3   ,   Results for orders placed or performed during the hospital encounter of 08/20/24   XR Chest 2 Views    Narrative    EXAM: XR CHEST 2 VIEWS  LOCATION: [facility]  DATE: 8/19/2024    INDICATION: persistent product cough  COMPARISON: None.      Impression    IMPRESSION: An ill-defined hazy opacity seen in the left lateral lung base suspicious for pneumonia. Mild atelectasis in the right lung base. No pleural effusion or pneumothorax. Heart size and  pulmonary vascularity are within normal limits.   CT Chest Pulmonary Embolism w Contrast    Narrative    EXAM: CT CHEST PULMONARY EMBOLISM W CONTRAST  LOCATION: [facility]  DATE: 8/20/2024    INDICATION: cough and shortness of breath  COMPARISON: None.  TECHNIQUE: CT chest pulmonary angiogram during arterial phase injection of IV contrast. Multiplanar reformats and MIP reconstructions were performed. Dose reduction techniques were used.   CONTRAST: [id number] 75ml    FINDINGS:  ANGIOGRAM CHEST: Pulmonary arteries are normal caliber and negative for pulmonary emboli. Thoracic aorta is negative for dissection. No CT evidence of right heart strain.    LUNGS AND PLEURA: Bronchial wall thickening and endobronchial mucous plugs present especially within left lower lobe and lingula. There are bands of parenchymal opacity in the same distribution likely atelectasis though developing pneumonia also   possible. Mosaic attenuation of lung parenchyma consistent small airways disease.    MEDIASTINUM/AXILLAE: Normal.    CORONARY ARTERY CALCIFICATION: None.    UPPER ABDOMEN: Normal.    MUSCULOSKELETAL: Normal.      Impression    IMPRESSION:  1.  No pulmonary emboli.  2.  Airway disease characterized by bronchial wall thickening and endobronchial mucous plugs especially involving lingula and left lower lobe with associated atelectasis. Mosaic attenuation of lung parenchyma also consistent with small airways disease.       Discharge Medications   Discharge Medication List as of 8/23/2024 10:49 AM        START taking these medications    Details   budesonide-formoterol (SYMBICORT) 160-4.5 MCG/ACT Inhaler Inhale 2 puffs into the lungs 2 times daily., Disp-10.2 g, R-0, E-Prescribe      levofloxacin (LEVAQUIN) 750 MG tablet Take 1 tablet (750 mg) by mouth daily for 5 days., Disp-5 tablet, R-0, E-Prescribe           CONTINUE these medications which have CHANGED    Details   predniSONE (DELTASONE) 20 MG tablet  Take 2 tablets (40 mg) by mouth daily for 2 days., Disp-4 tablet, R-0, E-Prescribe           CONTINUE these medications which have NOT CHANGED    Details   albuterol (PROAIR HFA/PROVENTIL HFA/VENTOLIN HFA) 108 (90 Base) MCG/ACT inhaler Inhale 1-2 puffs into the lungs every 6 hours as needed for shortness of breath, Historical      benzonatate (TESSALON) 200 MG capsule Take 200 mg by mouth 3 times daily as needed for cough, Historical      clonazePAM (KLONOPIN) 2 MG tablet Take 2 mg by mouth 3 times daily, Historical      HYDROcodone-acetaminophen 5-325 mg per tablet [HYDROCODONE-ACETAMINOPHEN 5-325 MG PER TABLET] Take 1 tablet by mouth every 4 (four) hours as needed for pain., Disp-8 tablet, R-0, Print      pregabalin (LYRICA) 150 MG capsule Take 150 mg by mouth daily, Historical           STOP taking these medications       cefPROZIL (CEFZIL) 500 MG tablet Comments:   Reason for Stopping:         levofloxacin (LEVAQUIN) 750 MG/150ML infusion Comments:   Reason for Stopping:             Allergies   Allergies   Allergen Reactions    Penicillins Hives and Rash

## 2024-08-23 NOTE — PLAN OF CARE
"  Goal: Absence of Hospital-Acquired Illness or Injury  Outcome: Progressing  Intervention: Prevent Skin Injury  Recent Flowsheet Documentation  Taken 8/23/2024 0430 by Airam Washington RN  Body Position: position changed independently  Taken 8/22/2024 2030 by Airam Washington RN  Body Position: position changed independently  Intervention: Prevent Infection  Recent Flowsheet Documentation  Taken 8/23/2024 0430 by Airam Washington RN  Infection Prevention: hand hygiene promoted  Taken 8/22/2024 2030 by Airam Washington RN  Infection Prevention: hand hygiene promoted  Goal: Optimal Comfort and Wellbeing  Outcome: Progressing  Intervention: Monitor Pain and Promote Comfort  Recent Flowsheet Documentation  Taken 8/22/2024 2150 by Airam Washington RN  Pain Management Interventions: medication (see MAR)  Goal: Readiness for Transition of Care  Outcome: Progressing     Problem: Pneumonia  Goal: Fluid Balance  Outcome: Progressing  Goal: Resolution of Infection Signs and Symptoms  Outcome: Progressing  Goal: Effective Oxygenation and Ventilation  Outcome: Progressing  Intervention: Promote Airway Secretion Clearance  Recent Flowsheet Documentation  Taken 8/23/2024 0430 by Airam Washington RN  Cough And Deep Breathing: done independently per patient  Taken 8/22/2024 2030 by Airam Washington RN  Cough And Deep Breathing: done independently per patient     Problem: Gas Exchange Impaired  Goal: Optimal Gas Exchange  Outcome: Progressing   Goal Outcome Evaluation:       Patient VSS. O2 maintained with 2L oxymask at above 92%. Patient did have some expiratory wheezing and frequent productive cough. Given mucinex, robitussin and tessalon for cough. Patient declined PRN nebulizer treatment stating \"it didn't help much last time.\" Patient c/o mild lower back pain that patient states is chronic pain from herniated disk. Pain treated with tylenol. Levaquin given per order. Patient up independently. Care and education " on-going.  Airam Washington RN

## 2024-08-25 LAB — BACTERIA BLD CULT: NO GROWTH

## 2024-09-06 ENCOUNTER — TELEPHONE (OUTPATIENT)
Dept: PULMONOLOGY | Facility: CLINIC | Age: 34
End: 2024-09-06
Payer: COMMERCIAL

## 2024-09-06 DIAGNOSIS — J45.909 ASTHMA: Primary | ICD-10-CM

## 2024-09-06 NOTE — TELEPHONE ENCOUNTER
[facility] Call Center    Phone Message    May a detailed message be left on voicemail: yes     Reason for Call: Appointment Intake    Referring Provider Name: Dr. Lizbeth Padilla  Diagnosis and/or Symptoms: Asthma    Pt scheduled to establish care 10/21/24 with Aziza DAVIES CNP, needs orders placed for PFT's (pended).     Action Taken: Other: Pulm    Travel Screening: Not Applicable

## 2024-10-21 ENCOUNTER — OFFICE VISIT (OUTPATIENT)
Dept: PULMONOLOGY | Facility: CLINIC | Age: 34
End: 2024-10-21
Attending: INTERNAL MEDICINE
Payer: COMMERCIAL

## 2024-10-21 VITALS
WEIGHT: 225.2 LBS | HEIGHT: 71 IN | HEART RATE: 75 BPM | SYSTOLIC BLOOD PRESSURE: 104 MMHG | OXYGEN SATURATION: 95 % | BODY MASS INDEX: 31.53 KG/M2 | DIASTOLIC BLOOD PRESSURE: 72 MMHG

## 2024-10-21 DIAGNOSIS — R06.2 WHEEZING: ICD-10-CM

## 2024-10-21 DIAGNOSIS — J18.9 PNEUMONIA OF LEFT LOWER LOBE DUE TO INFECTIOUS ORGANISM: Primary | ICD-10-CM

## 2024-10-21 LAB — HGB BLD-MCNC: 16.9 G/DL

## 2024-10-21 PROCEDURE — 94726 PLETHYSMOGRAPHY LUNG VOLUMES: CPT | Mod: 26 | Performed by: INTERNAL MEDICINE

## 2024-10-21 PROCEDURE — 94375 RESPIRATORY FLOW VOLUME LOOP: CPT | Mod: 26 | Performed by: INTERNAL MEDICINE

## 2024-10-21 PROCEDURE — 99207 PR NO BILLABLE SERVICE THIS VISIT: CPT | Performed by: INTERNAL MEDICINE

## 2024-10-21 PROCEDURE — 94729 DIFFUSING CAPACITY: CPT | Mod: 26 | Performed by: INTERNAL MEDICINE

## 2024-10-21 PROCEDURE — 99204 OFFICE O/P NEW MOD 45 MIN: CPT | Performed by: NURSE PRACTITIONER

## 2024-10-21 PROCEDURE — 85018 HEMOGLOBIN: CPT | Mod: QW

## 2024-10-21 NOTE — PATIENT INSTRUCTIONS
It was a pleasure seeing you in clinic today. This is what we discussed:    Think about seeing [facility] or ENT about your nose. Try breath right or magnetic strips.   If your cough returns we can get a follow up CT scan and try a steroid inhaler.   Use your albuterol every 4 hours as needed for cough, shortness of breath, wheeze, or chest tightness.   Follow up as needed   If you have worsening symptoms, questions, or need to speak with the nurse please call [phone].

## 2024-10-21 NOTE — PROGRESS NOTES
Pulmonary Outpatient Consult Note  October 21, 2024      Assessment and Plan:   Spencer A Vonbehren is a 34 year old M with history of RLS and insomnia presenting today for hospital follow up.   Denies prior asthma diagnosis or issues with breathing.  He does admit to a prolonged cough when he was a child that resolved on its own.  Hospitalized from 8/20-8/23 with mycoplasma pneumonia and acute respiratory failure.  Treated with prednisone and Levaquin.  Did not require oxygen at discharge.  Reports gradual improvement.  Very minimal cough persists, sputum is thin and yellow.    PFTs are normal.  No bronchodilator response was tested.  CT chest on 8/20 in ED showed bronchial wall thickening and endobronchial mucous plugs present especially within left lower lobe and lingula.   Lung exam and SpO2 are normal today.    #. Mycoplasma pneumonia: Had travel to [place] in [place] few months prior to illness onset.  No other known sick contacts.  As symptoms have pretty much resolved and PFTs are normal unlikely there is underlying reactive airway or asthma. We discussed follow-up and imaging but will hold off for now, low threshold to obtain follow-up scan if symptoms worsen.  We also discussed possible ICS trial to help resolve remainder of cough but he feels this will go away on its own and prefers to hold off for now.  Consider trial of ICS or ICS/LABA if cough worsens or does not resolve.  Follow-up CT chest if symptoms return.  #.  HCM:  Suggested he follow-up with sleep medicine or ENT regarding RLS, difficulty breathing through nose, and insomnia.  Recommend he stay up-to-date with respiratory vaccines      RTC as needed    Aziza Yoon, LARRY  Pulmonary Medicine  ______________________________________________________________________________    CC:   Chief Complaint   Patient presents with    [facility] Hospitalization     Date of Admission: 8/20/24  Date of Discharge: 8/23/24  Discharge Diagnoses  Acute hypoxic respiratory  failure  Mycoplasma pneumonia  Reactive airway disease  Community-acquired pneumonia  PFT 10/21/24  CT CHEST PE 8/20/24        HPI:   Waqar presents today for hospital follow-up.  Hospitalized in August with An acute respiratory failure. Respiratory panel positive for Mycoplasma Pneumoniae.  Sputum culture showed normal sancho.  Did not require oxygen on discharge.  Since returning home he reports slow improvement.  Occasional cough with some yellow sputum.   No chest tightness or wheeze.   Denies fevers or hemoptysis.    Denies history of asthma but states he did have a cough as a child that lasted for a long time.  He does not think a cause of this was ever found.  Does not remember needing inhalers or nebulizer treatments.  No family history of respiratory or lung disease.    PMH:  Patient Active Problem List    Diagnosis Date Noted    Wheezing 08/20/2024     Priority: Medium    Pneumonia of left lower lobe due to infectious organism 08/20/2024     Priority: Medium    Insomnia, idiopathic 09/08/2020     Priority: Medium    Restless legs syndrome (RLS) 03/05/2015     Priority: Medium     PSH:  No past surgical history on file.    Current Meds:  Current Outpatient Medications   Medication Sig Dispense Refill    albuterol (PROAIR HFA/PROVENTIL HFA/VENTOLIN HFA) 108 (90 Base) MCG/ACT inhaler Inhale 1-2 puffs into the lungs every 6 hours as needed for shortness of breath      clonazePAM (KLONOPIN) 2 MG tablet Take 2 mg by mouth 3 times daily      HYDROcodone-acetaminophen 5-325 mg per tablet [HYDROCODONE-ACETAMINOPHEN 5-325 MG PER TABLET] Take 1 tablet by mouth every 4 (four) hours as needed for pain. 8 tablet 0    pregabalin (LYRICA) 150 MG capsule Take 150 mg by mouth daily      budesonide-formoterol (SYMBICORT) 160-4.5 MCG/ACT Inhaler Inhale 2 puffs into the lungs 2 times daily. 10.2 g 0     No current facility-administered medications for this visit.       Allergies:  Allergies   Allergen Reactions    Penicillins  "Hives and Rash       Social Hx:  Marital status: lives with wife   Resides in a new build home, no concern for mold.  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    service: none  Pets: 3 dogs, 2 cats   Smoking history: never smoker   Recreational drug use: none, no vape    Family HX: family history is not on file.    ROS:   10-point review performed and notable for the above mentioned symptoms. The remainder reviewed and negative.     Physical Exam:  /72 (BP Location: Left arm, Patient Position: Sitting, Cuff Size: Adult Regular)   Pulse 75   Ht 1.808 m (5' 11.2\")   Wt 102.2 kg (225 lb 3.2 oz)   SpO2 95%   BMI 31.23 kg/m      Physical Exam  Constitutional:       General: He is not in acute distress.     Appearance: He is not ill-appearing.   Cardiovascular:      Rate and Rhythm: Normal rate and regular rhythm.      Heart sounds: Normal heart sounds.   Pulmonary:      Effort: Pulmonary effort is normal. No respiratory distress.      Breath sounds: No wheezing or rhonchi.   Musculoskeletal:      Right lower leg: No edema.      Left lower leg: No edema.   Neurological:      Mental Status: He is alert.   Psychiatric:         Behavior: Behavior normal.       PFT's     10/21/2024:      Impression:  Full Pulmonary Function Test is abnormal.  PFTs are consistent with  no  obstructive disease.  There is no hyperinflation.  There is no air-trapping.  Diffusion capacity when corrected for hemoglobin is not reduced.    Labs:   personally reviewed in the EMR.   08/20/24 01:59 08/20/24 02:50   RESPIRATORY AEROBIC BACTERIAL CULTURE  3+ Normal Milvia    RESPIRATORY PANEL PCR Mycoplasma Pneumoniae Detected    MRSA MSSA PCR, NASAL SWAB Rpt       Latest Reference Range & Units 08/20/24 01:59 08/20/24 03:36   L Pneumo Urine Antigen Negative   Negative   MRSA Target DNA Negative  Negative    S Pneumoniae Antigen Negative   Negative   SA Target DNA  Negative       Latest Reference " Range & Units 08/20/24 06:20   Absolute Eosinophils 0.0 - 0.7 10e3/uL 0.0       Imaging studies: personally reviewed and interpreted. Below are the Radiology interpretations.    CT CHEST PULMONARY EMBOLISM W CONTRAST, DATE: 8/20/2024  INDICATION: cough and shortness of breath  COMPARISON: None.  FINDINGS:  ANGIOGRAM CHEST: Pulmonary arteries are normal caliber and negative for pulmonary emboli. Thoracic aorta is negative for dissection. No CT evidence of right heart strain.  LUNGS AND PLEURA: Bronchial wall thickening and endobronchial mucous plugs present especially within left lower lobe and lingula. There are bands of parenchymal opacity in the same distribution likely atelectasis though developing pneumonia also   possible. Mosaic attenuation of lung parenchyma consistent small airways disease.                                                              IMPRESSION:  1.  No pulmonary emboli.  2.  Airway disease characterized by bronchial wall thickening and endobronchial mucous plugs especially involving lingula and left lower lobe with associated atelectasis. Mosaic attenuation of lung parenchyma also consistent with small airways disease.

## 2024-10-23 LAB
DLCOCOR-%PRED-PRE: 86 %
DLCOCOR-PRE: 28.8 ML/MIN/MMHG
DLCOUNC-%PRED-PRE: 91 %
DLCOUNC-PRE: 30.51 ML/MIN/MMHG
DLCOUNC-PRED: 33.39 ML/MIN/MMHG
ERV-%PRED-PRE: 50 %
ERV-PRE: 0.94 L
ERV-PRED: 1.85 L
EXPTIME-PRE: 5.76 SEC
FEF2575-%PRED-PRE: 88 %
FEF2575-PRE: 3.76 L/SEC
FEF2575-PRED: 4.27 L/SEC
FEFMAX-%PRED-PRE: 100 %
FEFMAX-PRE: 10.62 L/SEC
FEFMAX-PRED: 10.52 L/SEC
FEV1-%PRED-PRE: 98 %
FEV1-PRE: 4.18 L
FEV1FEV6-PRE: 80 %
FEV1FEV6-PRED: 83 %
FEV1FVC-PRE: 80 %
FEV1FVC-PRED: 83 %
FEV1SVC-PRE: 83 %
FEV1SVC-PRED: 79 %
FIFMAX-PRE: 10.9 L/SEC
FRCPLETH-%PRED-PRE: 64 %
FRCPLETH-PRE: 2.22 L
FRCPLETH-PRED: 3.45 L
FVC-%PRED-PRE: 101 %
FVC-PRE: 5.22 L
FVC-PRED: 5.15 L
IC-%PRED-PRE: 110 %
IC-PRE: 4.08 L
IC-PRED: 3.7 L
RVPLETH-%PRED-PRE: 67 %
RVPLETH-PRE: 1.28 L
RVPLETH-PRED: 1.89 L
TLCPLETH-%PRED-PRE: 85 %
TLCPLETH-PRE: 6.29 L
TLCPLETH-PRED: 7.37 L
VA-%PRED-PRE: 90 %
VA-PRE: 6.31 L
VC-%PRED-PRE: 94 %
VC-PRE: 5.02 L
VC-PRED: 5.33 L